# Patient Record
Sex: MALE | Race: WHITE
[De-identification: names, ages, dates, MRNs, and addresses within clinical notes are randomized per-mention and may not be internally consistent; named-entity substitution may affect disease eponyms.]

---

## 2019-01-09 ENCOUNTER — HOSPITAL ENCOUNTER (INPATIENT)
Dept: HOSPITAL 14 - H.ER | Age: 79
LOS: 2 days | Discharge: LEFT BEFORE DISCHARGE | DRG: 55 | End: 2019-01-11
Attending: GENERAL ACUTE CARE HOSPITAL | Admitting: GENERAL ACUTE CARE HOSPITAL
Payer: MEDICARE

## 2019-01-09 VITALS — BODY MASS INDEX: 27.3 KG/M2

## 2019-01-09 DIAGNOSIS — Z85.51: ICD-10-CM

## 2019-01-09 DIAGNOSIS — H40.9: ICD-10-CM

## 2019-01-09 DIAGNOSIS — I72.3: ICD-10-CM

## 2019-01-09 DIAGNOSIS — Z79.01: ICD-10-CM

## 2019-01-09 DIAGNOSIS — R26.81: ICD-10-CM

## 2019-01-09 DIAGNOSIS — S30.1XXA: ICD-10-CM

## 2019-01-09 DIAGNOSIS — J44.9: ICD-10-CM

## 2019-01-09 DIAGNOSIS — I12.9: ICD-10-CM

## 2019-01-09 DIAGNOSIS — Z87.442: ICD-10-CM

## 2019-01-09 DIAGNOSIS — H54.62: ICD-10-CM

## 2019-01-09 DIAGNOSIS — R29.6: ICD-10-CM

## 2019-01-09 DIAGNOSIS — Z86.73: ICD-10-CM

## 2019-01-09 DIAGNOSIS — Z79.899: ICD-10-CM

## 2019-01-09 DIAGNOSIS — N18.9: ICD-10-CM

## 2019-01-09 DIAGNOSIS — Z91.81: ICD-10-CM

## 2019-01-09 DIAGNOSIS — R53.1: ICD-10-CM

## 2019-01-09 DIAGNOSIS — F17.200: ICD-10-CM

## 2019-01-09 DIAGNOSIS — D49.6: Primary | ICD-10-CM

## 2019-01-09 DIAGNOSIS — M19.90: ICD-10-CM

## 2019-01-09 DIAGNOSIS — I48.2: ICD-10-CM

## 2019-01-09 LAB
ALBUMIN SERPL-MCNC: 3.9 G/DL (ref 3.5–5)
ALBUMIN/GLOB SERPL: 1.3 {RATIO} (ref 1–2.1)
ALT SERPL-CCNC: 39 U/L (ref 21–72)
APTT BLD: 41.6 SECONDS (ref 25.6–37.1)
AST SERPL-CCNC: 29 U/L (ref 17–59)
BASOPHILS # BLD AUTO: 0.1 K/UL (ref 0–0.2)
BASOPHILS NFR BLD: 1.2 % (ref 0–2)
BILIRUB UR-MCNC: NEGATIVE MG/DL
BUN SERPL-MCNC: 19 MG/DL (ref 9–20)
CALCIUM SERPL-MCNC: 9.3 MG/DL (ref 8.4–10.2)
COLOR UR: YELLOW
EOSINOPHIL # BLD AUTO: 0 K/UL (ref 0–0.7)
EOSINOPHIL NFR BLD: 0.5 % (ref 0–4)
ERYTHROCYTE [DISTWIDTH] IN BLOOD BY AUTOMATED COUNT: 16.5 % (ref 11.5–14.5)
GFR NON-AFRICAN AMERICAN: 46
GLUCOSE UR STRIP-MCNC: (no result) MG/DL
HDLC SERPL-MCNC: 46 MG/DL (ref 30–70)
HGB BLD-MCNC: 14.4 G/DL (ref 12–18)
INR PPP: 2
LDLC SERPL-MCNC: 111 MG/DL (ref 0–129)
LEUKOCYTE ESTERASE UR-ACNC: (no result) LEU/UL
LYMPHOCYTES # BLD AUTO: 1.6 K/UL (ref 1–4.3)
LYMPHOCYTES NFR BLD AUTO: 19 % (ref 20–40)
MCH RBC QN AUTO: 29.2 PG (ref 27–31)
MCHC RBC AUTO-ENTMCNC: 32.8 G/DL (ref 33–37)
MCV RBC AUTO: 89 FL (ref 80–94)
MONOCYTES # BLD: 0.5 K/UL (ref 0–0.8)
MONOCYTES NFR BLD: 6.3 % (ref 0–10)
NEUTROPHILS # BLD: 6 K/UL (ref 1.8–7)
NEUTROPHILS NFR BLD AUTO: 73 % (ref 50–75)
NRBC BLD AUTO-RTO: 0.1 % (ref 0–0)
PH UR STRIP: 5 [PH] (ref 5–8)
PLATELET # BLD: 181 K/UL (ref 130–400)
PMV BLD AUTO: 9.2 FL (ref 7.2–11.7)
PROT UR STRIP-MCNC: 30 MG/DL
PROTHROMBIN TIME: 22.8 SECONDS (ref 9.8–13.1)
RBC # BLD AUTO: 4.93 MIL/UL (ref 4.4–5.9)
RBC # UR STRIP: (no result) /UL
SP GR UR STRIP: 1.02 (ref 1–1.03)
TROPONIN I SERPL-MCNC: 0.02 NG/ML (ref 0–0.12)
URINE CLARITY: (no result)
URINE HYALINE CAST: (no result) /HPF (ref 0–2)
UROBILINOGEN UR-MCNC: (no result) MG/DL (ref 0.2–1)
VIT B12 SERPL-MCNC: 364 PG/ML (ref 239–931)
WBC # BLD AUTO: 8.2 K/UL (ref 4.8–10.8)

## 2019-01-09 RX ADMIN — PREDNISOLONE ACETATE SCH DROP: 10 SUSPENSION/ DROPS OPHTHALMIC at 19:48

## 2019-01-09 RX ADMIN — LATANOPROST SCH DROP: 50 SOLUTION OPHTHALMIC at 19:47

## 2019-01-09 RX ADMIN — LATANOPROST SCH: 50 SOLUTION OPHTHALMIC at 23:23

## 2019-01-09 RX ADMIN — PREDNISOLONE ACETATE SCH DROP: 10 SUSPENSION/ DROPS OPHTHALMIC at 23:23

## 2019-01-09 NOTE — CT
Date of service: 



01/09/2019



PROCEDURE:  CT HEAD WITHOUT CONTRAST.



HISTORY:

r/o ICH



COMPARISON:

None available.



TECHNIQUE:

Axial computed tomography images were obtained through the head/brain 

without intravenous contrast.  



Radiation dose:



Total exam DLP = 947.52 mGy-cm.



This CT exam was performed using one or more of the following dose 

reduction techniques: Automated exposure control, adjustment of the 

mA and/or kV according to patient size, and/or use of iterative 

reconstruction technique.



FINDINGS:



HEMORRHAGE:

No intracranial hemorrhage. 



BRAIN:

There is an apparent 2.5 x 3.2 cm low-attenuation area in the right 

posterior centrum semiovale and posterior parietal lobe also 

involving the right splenium of corpus callosum with mass effect and 

effacement of the atrium of the right lateral ventricle.  There are 2 

eccentric high attenuation foci in the posterior aspect of these 

lesion which may represent hemorrhage or slow flow in vessels.



There are moderate chronic microangiopathic changes.  There is no 

extra-axial fluid collection. 



VENTRICLES:





There is mild age-related global parenchymal volume loss and 

proportionate enlargement of the ventricles and cortical sulci. 



CALVARIUM:





There is no calvarial fracture or extracranial soft tissue swelling.



PARANASAL SINUSES:

Predominantly clear.



MASTOID AIR CELLS:

Predominantly clear.



OTHER FINDINGS:

None.



IMPRESSION:

1.  Apparent mass like lesion with eccentric small foci of 

hemorrhage/slow flow in blood vessels in the right posterior centrum 

semiovale and parietal lobe also involving the right splenium of 

corpus callosum with mass effect and effacement of the atrium of the 

right lateral ventricle.  The differential considerations include 

benign and malignant primary or secondary neoplasm, and nonspecific 

infection/inflammation.  An MRI of the brain without and with 

intravenous contrast is recommended for further characterization. 



2.  Moderate chronic microangiopathic changes and mild age-related 

global parenchymal volume loss. 



Important findings were discussed with Dr. Sesar Singh in the ER on 

01/09/2019 at 3:25 p.m.

## 2019-01-09 NOTE — CP.PCM.HP
History of Present Illness





- History of Present Illness


History of Present Illness: 


CC: frequent falls with left-sided weakness





HPI: 77 y/o male patient with PMHx of Cardiac Arrhythmias (A-Fib), COPD, HTN, 

Bladder cancer, and Chronic Kidney Disease was seen and evaluated in the ED due 

to complaints of multiple falls and with L-sided weakness. Patient states he has

been experiencing multiple falls as he feels unsteady and imbalanced. Patient 

was in [place] 2 weeks ago with daughter, sustained a fall in the b

athroom and was seen by a physician there. Patient reports no follow-up workup 

was completed to rule out CVA. Patient is complaining of left-sided flank pain. 

Patient is AAOx3. At this time, patient denies headaches, dizziness, fever, 

nausea, vomiting, abdominal pain, shortness of breath, chest pain or any urinary

complaints. Patient states he lives alone, and has a brother who lives in 

[place].





Vitals: Temp 98.1, Pulse 81, /95, RR 16, O2 stat 96





PMD/Cardiologist: Dr. Priest  


PMHx: Cardiac Arrhythmias (A-Fib), COPD, HTN, Bladder cancer, and Chronic Kidney

Disease, Left Eye blindness 


PSHx: Left Eye Surgery, Bladder Surgery, Endoscopy


Family Hx: unknown


Medications: see med chart


Allergies: no known allergies


Social Hx: current smoker





ED Course: 


Head CT


Abd/Pelvic CT


Labs


CXR/EKG


Neuro Consult


Cardio Consult





Emergency Contact: Benny Gaines [phone]








Present on Admission





- Present on Admission


Any Indicators Present on Admission: No


History of DVT/PE: No


History of Uncontrolled Diabetes: No


Urinary Catheter: No


Decubitus Ulcer Present: No





Review of Systems





- Constitutional


Constitutional: absent: Chills, Fever





- EENT


Eyes: absent: Blurred Vision, Change in Vision


Ears: absent: Dizziness





- Cardiovascular


Cardiovascular: absent: Chest Pain, Chest Pain at Rest, Chest Pain with 

Activity, Dyspnea on Exertion





- Respiratory


Respiratory: absent: Cough





- Gastrointestinal


Gastrointestinal: absent: Abdominal Pain, Constipation, Diarrhea, Nausea, 

Vomiting





- Neurological


Neurological: Weakness.  absent: Numbness, Tingling


Additional comments: 


L sided weakness








Past Patient History





- Past Medical History & Family History


Past Medical History?: Yes





- Past Social History


Smoking Status: Former Smoker





- CARDIAC


Hx Cardia Arrhythmia: Yes (a fib)


Hx Hypertension: Yes





- PULMONARY


Hx Chronic Obstructive Pulmonary Disease (COPD): Yes ((PER CHEST X-RAY RESULT 

05/12/16))





- NEUROLOGICAL


Hx Neurological Disorder: Yes


HX Cerebrovascular Accident: Yes (left eye)





- HEENT


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





- RENAL


Hx Chronic Kidney Disease: Yes


Hx Kidney Stones: Yes (PASSED ON OWN)





- HEMATOLOGICAL/ONCOLOGICAL


Hx Blood Disorders: Yes


Hx Cancer: Yes (BLADDER CANCER)





- MUSCULOSKELETAL/RHEUMATOLOGICAL


Hx Musculoskeletal Disorders: Yes


Hx Osteoarthritis: Yes





- GENITOURINARY/GYNECOLOGICAL


Hx Hematuria: Yes





- PSYCHIATRIC


Hx Substance Use: No





- SURGICAL HISTORY


Hx Surgeries: Yes


Other/Comment: cystoscopy bladder bx ful





- ANESTHESIA


Hx Anesthesia: Yes


Hx Anesthesia Reactions: Yes (tachycardia during colonoscopy)


Hx Malignant Hyperthermia: No





Meds


Allergies/Adverse Reactions: 


                                    Allergies











Allergy/AdvReac Type Severity Reaction Status Date / Time


 


No Known Allergies Allergy   Verified 01/09/19 12:32














Physical Exam





- Constitutional


Appears: Well, Non-toxic, No Acute Distress





- Head Exam


Head Exam: ATRAUMATIC, NORMOCEPHALIC





- Eye Exam


Eye Exam: Normal appearance, PERRL





- ENT Exam


ENT Exam: Mucous Membranes Moist, Normal Exam





- Neck Exam


Neck exam: Negative for: Full Rom, Lymphadenopathy





- Respiratory Exam


Respiratory Exam: Clear to Auscultation Bilateral, NORMAL BREATHING PATTERN.  

absent: Rales, Rhonchi, Wheezes





- Cardiovascular Exam


Cardiovascular Exam: Irregular Rhythm





- GI/Abdominal Exam


GI & Abdominal Exam: Normal Bowel Sounds, Soft.  absent: Distended, Firm, 

Guarding, Tenderness





- Neurological Exam


Neurological exam: Alert, Oriented x3





- Psychiatric Exam


Psychiatric exam: Normal Affect, Normal Mood





- Skin


Skin Exam: Normal Color





Results





- Vital Signs


Recent Vital Signs: 





                                Last Vital Signs











Temp  98.1 F   01/09/19 12:32


 


Pulse  81   01/09/19 12:32


 


Resp  16   01/09/19 12:32


 


BP  149/95 H  01/09/19 12:32


 


Pulse Ox  96   01/09/19 12:59














- Labs


Result Diagrams: 


                                 01/09/19 14:21





                                 01/09/19 14:21


Labs: 





                         Laboratory Results - last 24 hr











  01/09/19 01/09/19 01/09/19





  14:21 14:21 14:21


 


WBC  8.2  


 


RBC  4.93  


 


Hgb  14.4  


 


Hct  43.9  


 


MCV  89.0  


 


MCH  29.2  


 


MCHC  32.8 L  


 


RDW  16.5 H  


 


Plt Count  181  


 


MPV  9.2  


 


Neut % (Auto)  73.0  


 


Lymph % (Auto)  19.0 L  


 


Mono % (Auto)  6.3  


 


Eos % (Auto)  0.5  


 


Baso % (Auto)  1.2  


 


Neut # (Auto)  6.0  


 


Lymph # (Auto)  1.6  


 


Mono # (Auto)  0.5  


 


Eos # (Auto)  0.0  


 


Baso # (Auto)  0.1  


 


PT    22.8 H


 


INR    2.0


 


APTT    41.6 H


 


Sodium   143 


 


Potassium   4.1 


 


Chloride   106 


 


Carbon Dioxide   23 


 


Anion Gap   18 


 


BUN   19 


 


Creatinine   1.5 


 


Est GFR ( Amer)   55 


 


Est GFR (Non-Af Amer)   46 


 


Random Glucose   110 


 


Calcium   9.3 


 


Total Bilirubin   1.3 


 


AST   29 


 


ALT   39 


 


Alkaline Phosphatase   92 


 


Total Creatine Kinase   187 H 


 


Troponin I   0.0200 


 


Total Protein   7.0 


 


Albumin   3.9 


 


Globulin   3.1 


 


Albumin/Globulin Ratio   1.3 


 


TSH 3rd Generation   0.81 














Assessment & Plan





- Assessment and Plan (Free Text)


Assessment: 


77 y/o male with PMHx of Cardiac Arrhythmias (A-Fib), COPD, HTN, Kidney Stones, 

and Chronic Kidney Disease admitted for frequent falls with hx of left sided 

weakness, r/o brain mass and/or hemorrhage, findings suspicious on Head CT





Plan: 


Frequent Fall


- acute, r/o brain mass and/or hemorrhage, findings suspicious on Head CT


- Head CT: mass like lesion with eccentric small foci of hemorrhage/slow flow in

blood vessels in the right posterior centrum semiovale and parietal lobe 

involving the right splenium of corpus callosum, MRI recommended 


- Hold Coumadin at this time


- CXR: no active disease


- Neurology Consult, Dr. Meneses- recommendations appreciated


- Neuro Checks





Left Flank Pain with bruising


- acute, symptomatic 


- Abd/Pelvic CT: no acute abdominal or pelvic abnormality, no evidence 

intraperitoneal or retroperitoneal hemorrhage 





Atrial Fibrillation


- chronic, rate-controlled


- patient on Warfarin 3mg and 4 mg tabs alternatively


- Hold Coumadin at this time


- Repeat INR tomorrow





HTN


- chronic, controlled


- continue home meds 





Left Sided Blindness


- chronic


- continue home meds





Diet


- Heart Healthy Diet





DVT Prophylaxis


- SCDs for now





Code Status


- Unknown Full Code








- Date & Time


Date: 01/09/19


Time: 16:52

## 2019-01-09 NOTE — CARD
--------------- APPROVED REPORT --------------





Date of service: 01/09/2019



EKG Measurement

Heart [id number]

[id number]

[id number]

[id number]



<Conclusion>

Atrial fibrillation

ST & T wave abnormality, consider anterolateral ischemia

Prolonged QT

Abnormal ECG

## 2019-01-09 NOTE — ED PDOC
HPI: General Adult


Time Seen by Provider: 01/09/19 12:42


Chief Complaint (Nursing): Weakness/Neurological Deficit


Chief Complaint (Provider): frequent falls, L sided weakness


History Per: Patient


History/Exam Limitations: no limitations


Onset/Duration Of Symptoms: Days (2-3 weeks), Intermittent Episodes


Current Symptoms Are (Timing): Still Present


Severity: Moderate


Location Of Discomfort (Image): 


                            __________________________














                            __________________________





 1 - pain, bruising





Recent Trauma: +falls


Recently: Treated By A Physician


Additional Complaint(s): 





79yo male c/o frequent falls and ongoing imbalance, L sided weakness, L flank d

iscomfort after a fall in [place] 2 weeks ago, takes warfarin daily. 


Notes difficulty w L eye since surgery for "bleeding in eye" several months ago.

 





Past Medical History


Reviewed: Historical Data, Nursing Documentation, Vital Signs


Vital Signs: 





                                Last Vital Signs











Temp  98.1 F   01/09/19 12:32


 


Pulse  81   01/09/19 12:32


 


Resp  16   01/09/19 12:32


 


BP  149/95 H  01/09/19 12:32


 


Pulse Ox  96   01/09/19 12:32














- Medical History


PMH: Cardia Arrhythmia (a fib), COPD ((PER CHEST X-RAY RESULT 05/12/16)), HTN, 

Kidney Stones (PASSED ON OWN), Chronic Kidney Disease





- Surgical History


Surgical History: Endoscopy


Other surgeries: eye and bladder





- Family History


Family History: States: Unknown Family Hx





- Living Arrangements


Living Arrangements: Alone





- Social History


Current smoker - smoking cessation education provided: No





- Home Medications


Home Medications: 


                                Ambulatory Orders











 Medication  Instructions  Recorded


 


Bimatoprost [Lumigan 2.5 ml] 1 drop OU BID 01/21/15


 


Warfarin [Coumadin] 4 mg PO Q48H 01/21/15


 


Metoprolol Succinate XL [Toprol XL] 100 mg PO DAILY 05/12/16


 


Losartan [Cozaar] 100 mg PO DAILY 01/09/19


 


PrednisoLONE 1% [Pred Forte 1% 1 drop RIGHTEYE QID 01/09/19





Opht Susp]  


 


Warfarin [Coumadin] 3 mg PO Q48H 01/09/19














- Allergies


Allergies/Adverse Reactions: 


                                    Allergies











Allergy/AdvReac Type Severity Reaction Status Date / Time


 


No Known Allergies Allergy   Verified 01/09/19 12:32














Review of Systems


ROS Statement: Except As Marked, All Systems Reviewed And Found Negative


Constitutional: Negative for: Fever


Cardiovascular: Negative for: Chest Pain


Respiratory: Negative for: Cough, Shortness of Breath


Gastrointestinal: Negative for: Nausea, Abdominal Pain


Genitourinary Male: Negative for: Dysuria


Musculoskeletal: Positive for: Back Pain, Other (elbow).  Negative for: Neck 

Pain


Skin: Negative for: Rash, Lesions, Jaundice


Neurological: Positive for: Weakness, Incoordination, Headache, Dizziness.  

Negative for: Numbness, Change in Speech, Confusion





Physical Exam





- Reviewed


Nursing Documentation Reviewed: Yes


Vital Signs Reviewed: Yes





- Physical Exam


Appears: Positive for: Well, Non-toxic, No Acute Distress


Head Exam: Positive for: ATRAUMATIC, NORMAL INSPECTION, NORMOCEPHALIC


Skin: Positive for: Normal Color, Warm, DRY


Eye Exam: Positive for: EOMI, Normal appearance, PERRL


ENT: Positive for: Normal ENT Inspection


Neck: Positive for: Normal, Painless ROM


Cardiovascular/Chest: Positive for: Regular Rate, Rhythm


Respiratory: Positive for: CNT, Normal Breath Sounds


Pulses-Radial (L): 3+/4+


Pulses-Radial (R): 3+/4+


Gastrointestinal/Abdominal: Positive for: Soft, Other (L flank +ecchymosis).  

Negative for: Tenderness


Back: Positive for: Normal Inspection


Extremity: Positive for: Normal ROM, Other (L elbow superficial abrasion 

nontender bony prominences)


Neurologic/Psych: Positive for: Alert, Oriented.  Negative for: Motor/Sensory 

Deficits





- Laboratory Results


Result Diagrams: 


                                 01/09/19 14:21





                                 01/09/19 14:21





- ECG


O2 Sat by Pulse Oximetry: 96





Medical Decision Making


Medical Decision Making: 





workup for frequent falls while taking coumadin initiated








CT results d/w neurology Dr Meneses saw patient in ED, rec MRI brain w and wo 

contrast, order placed admitting team Dr Drake for Dr Priest aware to 

follow result





Rec hold anticoagulation given potential small hemorrhage on CT








Accession No. : [id number]


Patient Name / ID : NICCI PECK  / [id number]


Exam Date : 01/09/2019 14:48:39 ( Approved )


Study Comment : 


Sex / Age : M  / 78Y





Creator : Elzbieta Donnelly MD


Dictator : Elzbieta Donnelly MD


 : 


Approver : Elzbieta Donnelly MD


Approver2 : 





Report Date : 01/09/2019 15:33:20


My Comment : 


********************************************

***************************************





Date of service: 





01/09/2019





PROCEDURE:  CT HEAD WITHOUT CONTRAST.





HISTORY:


r/o ICH





COMPARISON:


None available.





TECHNIQUE:


Axial computed tomography images were obtained through the head/brain without 

intravenous contrast.  





Radiation dose:





Total exam DLP = 947.52 mGy-cm.





This CT exam was performed using one or more of the following dose reduction 

techniques: Automated exposure control, adjustment of the mA and/or kV according

 to patient size, and/or use of iterative reconstruction technique.





FINDINGS:





HEMORRHAGE:


No intracranial hemorrhage. 





BRAIN:


There is an apparent 2.5 x 3.2 cm low-attenuation area in the right posterior 

centrum semiovale and posterior parietal lobe also involving the right splenium 

of corpus callosum with mass effect and effacement of the atrium of the right 

lateral ventricle.  There are 2 eccentric high attenuation foci in the posterior

 aspect of these lesion which may represent hemorrhage or slow flow in vessels.





There are moderate chronic microangiopathic changes.  There is no extra-axial 

fluid collection. 





VENTRICLES:








There is mild age-related global parenchymal volume loss and proportionate e

nlargement of the ventricles and cortical sulci. 





CALVARIUM:








There is no calvarial fracture or extracranial soft tissue swelling.





PARANASAL SINUSES:


Predominantly clear.





MASTOID AIR CELLS:


Predominantly clear.





OTHER FINDINGS:


None.





IMPRESSION:


1.  Apparent mass like lesion with eccentric small foci of hemorrhage/slow flow 

in blood vessels in the right posterior centrum semiovale and parietal lobe also

 involving the right splenium of corpus callosum with mass effect and effacement

 of the atrium of the right lateral ventricle.  The differential considerations 

include benign and malignant primary or secondary neoplasm, and nonspecific 

infection/inflammation.  An MRI of the brain without and with intravenous 

contrast is recommended for further characterization. 





2.  Moderate chronic microangiopathic changes and mild age-related global 

parenchymal volume loss. 





Important findings were discussed with Dr. Sesar Singh in the ER on 01/09/2019 

at 3:25 p.m.











Accession No. : [id number]


Patient Name / ID : NICCI PECK  / [id number]


Exam Date : 01/09/2019 14:51:19 ( Approved )


Study Comment : 


Sex / Age : M  / 78Y





Creator : Elzbieta Donnelly MD


Dictator : Elzbieta Donnelly MD


 : 


Approver : Elzbieta Donnelly MD


Approver2 : 





Report Date : 01/09/2019 15:48:56


My Comment : 


****************************

*******************************************************





Date of service: 





01/09/2019





PROCEDURE:  CT Abdomen and Pelvis without intravenous contrast





HISTORY:


L flank ecchymosis, falls, on warfarin





COMPARISON:


None.





TECHNIQUE:


CT scan of the abdomen and pelvis was performed without administration of 

intravenous contrast. Oral contrast was not administered. Coronal and sagittal 

reformatted images were obtained. . 





Radiation dose:





Total exam DLP = 537.5 mGy-cm.





This CT exam was performed using one or more of the following dose reduction 

techniques: Automated exposure control, adjustment of the mA and/or kV according

 to patient size, and/or use of iterative reconstruction technique.





FINDINGS:





LOWER THORAX:


The visualized lungs are clear. 





LIVER:


Normal in size. No intrahepatic ductal dilatation. 





GALLBLADDER AND BILE DUCTS:


The gallbladder is contracted.  No calcified gallstones. No biliary dilatation





PANCREAS:


Normal in size.  There is a punctate nonspecific calcification in the body of 

the pancreas.  No ductal dilatation.





SPLEEN:


Normal in size.





ADRENALS:


The right adrenal gland is normal in size without discrete nodule.  There is a 

1.3 cm benign adenoma in the left adrenal gland. 





KIDNEYS AND URETERS:


Normal in size without nephrolithiasis. No hydronephrosis.  There is nonspecific

 perinephric fat stranding. 





VASCULATURE:


The aorta is tortuous.  There is fusiform aneurysm of the distal infrarenal 

aorta measuring 3.3 cm in diameter. There are aortic and I iliac artery 

atherosclerotic calcifications present.





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 

measures 1.5 cm in diameter. 





BOWEL:


The small bowel loops are normal in caliber. The colon is normal in size. No 

bowel dilatation or wall thickening.  No bowel obstruction. 





APPENDIX:


Normal appendix. 





PERITONEUM:


No free fluid. No free air. 





LYMPH NODES:


No enlarged lymph nodes.





BLADDER:


Partially decompressed. 





REPRODUCTIVE:


There is mild enlargement of the prostate gland. 





BONES:


No acute fracture.  Multilevel degenerative changes, worse in the lower lumbar 

spine 





OTHER FINDINGS:


There are bilateral small fat containing inguinal hernias.  There is a small 

sliding hiatal hernia.





There is minimal subcutaneous fat stranding in the left flank without evidence 

for subcutaneous hematoma. 





IMPRESSION:


No acute abdominal or pelvic abnormality.  Specifically, no evidence for acute 

intraperitoneal or retroperitoneal hemorrhage. 





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











Disposition





- Clinical Impression


Clinical Impression: 


 Brain mass, Left-sided weakness








- Patient ED Disposition


Is Patient to be Admitted: Yes


Counseled Patient/Family Regarding: Studies Performed, Diagnosis





- Disposition


Disposition Time: 14:01


Condition: FAIR





- Pt Status Changed To:


Hospital Disposition Of: Inpatient





- Admit Certification


Admit to Inpatient:: After my assessment, the patient will require 

hospitalization for at least two midnights.  This is because of the severity of 

symptoms shown, intensity of services needed, and/or the medical risk in this 

patient being treated as an outpatient.





- POA


Present On Arrival: Falls Or Trauma

## 2019-01-09 NOTE — CP.PCM.CON
History of Present Illness





- History of Present Illness


History of Present Illness: 





THE PATIENT IS A 78 YEAR OLD MALE WHO HAS A LONG HISTORY OF HYPERTENSION, 

CHRONIC ATRIAL FIBRILLATION, HE  IS A FORMER SMOKER AND HAD BLADDER CANCER, AND 

HE HAD A STROKE INVOLVING HIS LEFT EYE SEVERAL YEARS AGO WITH RESULTING 

DECREASED VISION IN THAT EYE.  HE HAD LEFT EYE SURGERY A FEW MONTHS AGO BUT 

DOESN'T KNOW DETAILS OF THAT SURGERY.  I HAVEN'T SEEN HIM FOR 9 MONTHS AND I SAW

HIM IN THE OFFICE YESTERDAY AND HE STATED THAT FOR A FEW MONTHS HE HAD NEW LEFT 

SIDED WEAKNESS, POOR BALANCE WITH AN UNSTEADY GAIT AND SEVERAL FALLS.  HE WAS IN

[place] RECENTLY VISITING HIS DAUGHTER FOR THE HOLIDAYS AND I SPOKE TO 

HER AND SHE THOUGHT THAT HE COULD HARDLY WALK ONE DAY A COUPLE OF WEEKS AGO AND 

SHE BROUGHT HIM TO A LOCAL PHYSICIAN WHO SAW HIM IN THE OFFICE AND HIS INR WAS 

SUBTHERAPEUTIC.  HE DID NOT GO TO THE HOSPITAL AND DID NOT HAVE ANY NEUROLOGICAL

IMAGING AND CAME BACK TO [place] AFTER THE HOLIDAYS.  HE STATED THAT HE KEEPS

FALLING AND IS CONFUSED AND FELL 2 DAYS AGO AND HIT THE LEFT SIDE OF HIS CHEST 

ON A CHAIR AND IT BECAME BRUISED.  I TOLD HIM THAT HE SHOULD GO TO THE ER FOR 

EVALUATION INCLUDING BRAIN IMAGING AND BE ADMITTED FOR NEUROLOGICAL EVALUATION 

AND TREATMENT.  IT WAS ALSO UNSAFE OF HIM TO STAY HOME DUE TO HIS MANY FALLS.  

HE CAME TO THE ER TODAY AND A CT SHOWED A RIGHT BRAIN MASS AND HE WAS ADMITTED.





Past Patient History





- Past Medical History & Family History


Past Medical History?: Yes





- Past Social History


Smoking Status: Former Smoker





- CARDIAC


Hx Cardia Arrhythmia: Yes (a fib)


Hx Hypertension: Yes





- PULMONARY


Hx Chronic Obstructive Pulmonary Disease (COPD): Yes ((PER CHEST X-RAY RESULT 

05/12/16))





- NEUROLOGICAL


Hx Neurological Disorder: Yes


HX Cerebrovascular Accident: Yes (left eye)





- HEENT


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





- RENAL


Hx Chronic Kidney Disease: Yes


Hx Kidney Stones: Yes (PASSED ON OWN)





- HEMATOLOGICAL/ONCOLOGICAL


Hx Blood Disorders: Yes


Hx Cancer: Yes (BLADDER CANCER)





- MUSCULOSKELETAL/RHEUMATOLOGICAL


Hx Musculoskeletal Disorders: Yes


Hx Osteoarthritis: Yes





- GENITOURINARY/GYNECOLOGICAL


Hx Hematuria: Yes





- PSYCHIATRIC


Hx Substance Use: No





- SURGICAL HISTORY


Hx Surgeries: Yes


Other/Comment: cystoscopy bladder bx ful





- ANESTHESIA


Hx Anesthesia: Yes


Hx Anesthesia Reactions: Yes (tachycardia during colonoscopy)


Hx Malignant Hyperthermia: No





Meds


Allergies/Adverse Reactions: 


                                    Allergies











Allergy/AdvReac Type Severity Reaction Status Date / Time


 


No Known Allergies Allergy   Verified 01/09/19 12:32














- Medications


Medications: 


                               Current Medications





Docusate Sodium (Colace)  100 mg PO BID PRN


   PRN Reason: Constipation


Latanoprost (Xalatan Opht)  1 drop OU HS VAISHNAVI


Losartan Potassium (Cozaar)  100 mg PO DAILY VAISHNAVI


Metoprolol Succinate (Toprol Xl)  100 mg PO DAILY VAISHNAVI


Prednisolone Acetate (Pred Forte 1% Opht Susp)  1 drop OU QID VAISHNAVI











Physical Exam





- Respiratory Exam


Respiratory Exam: Clear to Auscultation Bilateral





- Cardiovascular Exam


Cardiovascular Exam: Irregular Rhythm, +S1, +S2





- Extremities Exam


Additional comments: 





NO SIGNIFICANT LE EDEMA





- Neurological Exam


Additional comments: 





LEFT SIDED WEAKNESS





- Skin


Additional comments: 





LEFT CHEST ECCHYMOSIS





- Additional Findings


Additional findings: 





CT OF THE BRAIN SHOWS A RIGHT SIDED MASS WITH SMALL HEMORRHAGIC FOCI





EKG ATRIAL FINRILLATION





CXR CARDIOMEGALY, CLEAR LUNG BERNAL





Results





- Vital Signs


Recent Vital Signs: 


                                Last Vital Signs











Temp  98 F   01/09/19 17:32


 


Pulse  78   01/09/19 17:32


 


Resp  18   01/09/19 17:32


 


BP  122/83   01/09/19 17:32


 


Pulse Ox  97   01/09/19 17:32














- Labs


Result Diagrams: 


                                 01/09/19 14:21





                                 01/09/19 14:21


Labs: 


                         Laboratory Results - last 24 hr











  01/09/19 01/09/19 01/09/19





  14:21 14:21 14:21


 


WBC  8.2  


 


RBC  4.93  


 


Hgb  14.4  


 


Hct  43.9  


 


MCV  89.0  


 


MCH  29.2  


 


MCHC  32.8 L  


 


RDW  16.5 H  


 


Plt Count  181  


 


MPV  9.2  


 


Neut % (Auto)  73.0  


 


Lymph % (Auto)  19.0 L  


 


Mono % (Auto)  6.3  


 


Eos % (Auto)  0.5  


 


Baso % (Auto)  1.2  


 


Neut # (Auto)  6.0  


 


Lymph # (Auto)  1.6  


 


Mono # (Auto)  0.5  


 


Eos # (Auto)  0.0  


 


Baso # (Auto)  0.1  


 


PT    22.8 H


 


INR    2.0


 


APTT    41.6 H


 


Sodium   143 


 


Potassium   4.1 


 


Chloride   106 


 


Carbon Dioxide   23 


 


Anion Gap   18 


 


BUN   19 


 


Creatinine   1.5 


 


Est GFR ( Amer)   55 


 


Est GFR (Non-Af Amer)   46 


 


Random Glucose   110 


 


Calcium   9.3 


 


Total Bilirubin   1.3 


 


AST   29 


 


ALT   39 


 


Alkaline Phosphatase   92 


 


Total Creatine Kinase   187 H 


 


Troponin I   0.0200 


 


Total Protein   7.0 


 


Albumin   3.9 


 


Globulin   3.1 


 


Albumin/Globulin Ratio   1.3 


 


Triglycerides   


 


Cholesterol   


 


LDL Cholesterol Direct   


 


HDL Cholesterol   


 


Vitamin B12   


 


TSH 3rd Generation   0.81 


 


Urine Color   


 


Urine Clarity   


 


Urine pH   


 


Ur Specific Gravity   


 


Urine Protein   


 


Urine Glucose (UA)   


 


Urine Ketones   


 


Urine Blood   


 


Urine Nitrate   


 


Urine Bilirubin   


 


Urine Urobilinogen   


 


Ur Leukocyte Esterase   


 


Urine RBC (Auto)   


 


Urine Microscopic WBC   


 


Hyaline Casts   














  01/09/19 01/09/19





  16:30 17:19


 


WBC  


 


RBC  


 


Hgb  


 


Hct  


 


MCV  


 


MCH  


 


MCHC  


 


RDW  


 


Plt Count  


 


MPV  


 


Neut % (Auto)  


 


Lymph % (Auto)  


 


Mono % (Auto)  


 


Eos % (Auto)  


 


Baso % (Auto)  


 


Neut # (Auto)  


 


Lymph # (Auto)  


 


Mono # (Auto)  


 


Eos # (Auto)  


 


Baso # (Auto)  


 


PT  


 


INR  


 


APTT  


 


Sodium  


 


Potassium  


 


Chloride  


 


Carbon Dioxide  


 


Anion Gap  


 


BUN  


 


Creatinine  


 


Est GFR ( Amer)  


 


Est GFR (Non-Af Amer)  


 


Random Glucose  


 


Calcium  


 


Total Bilirubin  


 


AST  


 


ALT  


 


Alkaline Phosphatase  


 


Total Creatine Kinase  


 


Troponin I  


 


Total Protein  


 


Albumin  


 


Globulin  


 


Albumin/Globulin Ratio  


 


Triglycerides  94 


 


Cholesterol  169 


 


LDL Cholesterol Direct  111 


 


HDL Cholesterol  46 


 


Vitamin B12  364 


 


TSH 3rd Generation  


 


Urine Color   Yellow


 


Urine Clarity   Slighty-cloudy


 


Urine pH   5.0


 


Ur Specific Gravity   1.023


 


Urine Protein   30


 


Urine Glucose (UA)   Neg


 


Urine Ketones   Trace


 


Urine Blood   Moderate


 


Urine Nitrate   Negative


 


Urine Bilirubin   Negative


 


Urine Urobilinogen   0.2-1.0


 


Ur Leukocyte Esterase   Neg


 


Urine RBC (Auto)   24 H


 


Urine Microscopic WBC   3


 


Hyaline Casts   6-10 H














Assessment & Plan





- Assessment and Plan (Free Text)


Assessment: 





RIGHT BRAIN TUMOR WITH LEFT SIDED WEAKNESS WITH GAIT DISTIRBANCE, RECURRENT 

FALLS AND CONFUSION





CHRONIC ATRIAL FIBRILLATION





HYPERTENSION





HISTORY OF BLADDER CA


Plan: 





THE PATIENT WAS ADMITTED





NEUROLOGY CONSULTATION





MRI OF THE BRAIN





METOPROLOL AND LOSARTAN





HOLD WARFARIN DUE TO BRAIN HEMORRHAGIC FOCI





THE DAUGHTER WAS CALLED AND UPDATED AS TO THE CT RESULTS AND PENDING MRI AND 

NEUROLOGY EVALUATION

## 2019-01-09 NOTE — RAD
Date of service: 



01/09/2019



HISTORY:

 SOB 



COMPARISON:

No prior. 



FINDINGS:



LUNGS:

No active pulmonary disease.



PLEURA:

No significant pleural effusion identified, no pneumothorax apparent.



CARDIOVASCULAR:

No atherosclerotic calcification present



Cardiomegaly.  No evidence of acute, significant cardiovascular 

disease. 



OSSEOUS STRUCTURES:

No significant abnormalities.



VISUALIZED UPPER ABDOMEN:

Normal.



OTHER FINDINGS:

None.



IMPRESSION:

No active disease.

## 2019-01-09 NOTE — CT
Date of service: 



01/09/2019



PROCEDURE:  CT Abdomen and Pelvis without intravenous contrast



HISTORY:

L flank ecchymosis, falls, on warfarin



COMPARISON:

None.



TECHNIQUE:

CT scan of the abdomen and pelvis was performed without 

administration of intravenous contrast. Oral contrast was not 

administered. Coronal and sagittal reformatted images were obtained. 

. 



Radiation dose:



Total exam DLP = 537.5 mGy-cm.



This CT exam was performed using one or more of the following dose 

reduction techniques: Automated exposure control, adjustment of the 

mA and/or kV according to patient size, and/or use of iterative 

reconstruction technique.



FINDINGS:



LOWER THORAX:

The visualized lungs are clear. 



LIVER:

Normal in size. No intrahepatic ductal dilatation. 



GALLBLADDER AND BILE DUCTS:

The gallbladder is contracted.  No calcified gallstones. No biliary 

dilatation



PANCREAS:

Normal in size.  There is a punctate nonspecific calcification in the 

body of the pancreas.  No ductal dilatation.



SPLEEN:

Normal in size.



ADRENALS:

The right adrenal gland is normal in size without discrete nodule.  

There is a 1.3 cm benign adenoma in the left adrenal gland. 



KIDNEYS AND URETERS:

Normal in size without nephrolithiasis. No hydronephrosis.  There is 

nonspecific perinephric fat stranding. 



VASCULATURE:

The aorta is tortuous.  There is fusiform aneurysm of the distal 

infrarenal aorta measuring 3.3 cm in diameter. There are aortic and I 

iliac artery atherosclerotic calcifications present.



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 

artery which measures 1.5 cm in diameter. 



BOWEL:

The small bowel loops are normal in caliber. The colon is normal in 

size. No bowel dilatation or wall thickening.  No bowel obstruction. 



APPENDIX:

Normal appendix. 



PERITONEUM:

No free fluid. No free air. 



LYMPH NODES:

No enlarged lymph nodes.



BLADDER:

Partially decompressed. 



REPRODUCTIVE:

There is mild enlargement of the prostate gland. 



BONES:

No acute fracture.  Multilevel degenerative changes, worse in the 

lower lumbar spine 



OTHER FINDINGS:

There are bilateral small fat containing inguinal hernias.  There is 

a small sliding hiatal hernia.



There is minimal subcutaneous fat stranding in the left flank without 

evidence for subcutaneous hematoma. 



IMPRESSION:

No acute abdominal or pelvic abnormality.  Specifically, no evidence 

for acute intraperitoneal or retroperitoneal hemorrhage. 



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 

common iliac artery.

## 2019-01-10 LAB
ALBUMIN SERPL-MCNC: 3.8 G/DL (ref 3.5–5)
ALBUMIN/GLOB SERPL: 1.2 {RATIO} (ref 1–2.1)
ALT SERPL-CCNC: 42 U/L (ref 21–72)
APTT BLD: 45 SECONDS (ref 25.6–37.1)
AST SERPL-CCNC: 28 U/L (ref 17–59)
BASOPHILS # BLD AUTO: 0 K/UL (ref 0–0.2)
BASOPHILS NFR BLD: 0.8 % (ref 0–2)
BUN SERPL-MCNC: 22 MG/DL (ref 9–20)
CALCIUM SERPL-MCNC: 9.4 MG/DL (ref 8.4–10.2)
EOSINOPHIL # BLD AUTO: 0.1 K/UL (ref 0–0.7)
EOSINOPHIL NFR BLD: 1.1 % (ref 0–4)
ERYTHROCYTE [DISTWIDTH] IN BLOOD BY AUTOMATED COUNT: 16.2 % (ref 11.5–14.5)
GFR NON-AFRICAN AMERICAN: 49
HGB BLD-MCNC: 14.1 G/DL (ref 12–18)
INR PPP: 2.2
LYMPHOCYTES # BLD AUTO: 1.7 K/UL (ref 1–4.3)
LYMPHOCYTES NFR BLD AUTO: 26.5 % (ref 20–40)
MCH RBC QN AUTO: 29.4 PG (ref 27–31)
MCHC RBC AUTO-ENTMCNC: 33 G/DL (ref 33–37)
MCV RBC AUTO: 89 FL (ref 80–94)
MONOCYTES # BLD: 0.5 K/UL (ref 0–0.8)
MONOCYTES NFR BLD: 8.5 % (ref 0–10)
NEUTROPHILS # BLD: 3.9 K/UL (ref 1.8–7)
NEUTROPHILS NFR BLD AUTO: 63.1 % (ref 50–75)
NRBC BLD AUTO-RTO: 0.1 % (ref 0–0)
PLATELET # BLD: 163 K/UL (ref 130–400)
PMV BLD AUTO: 9.3 FL (ref 7.2–11.7)
PROTHROMBIN TIME: 24.6 SECONDS (ref 9.8–13.1)
RBC # BLD AUTO: 4.8 MIL/UL (ref 4.4–5.9)
WBC # BLD AUTO: 6.2 K/UL (ref 4.8–10.8)

## 2019-01-10 RX ADMIN — PREDNISOLONE ACETATE SCH DROP: 10 SUSPENSION/ DROPS OPHTHALMIC at 14:20

## 2019-01-10 RX ADMIN — LATANOPROST SCH DROP: 50 SOLUTION OPHTHALMIC at 21:07

## 2019-01-10 RX ADMIN — PREDNISOLONE ACETATE SCH DROP: 10 SUSPENSION/ DROPS OPHTHALMIC at 21:08

## 2019-01-10 RX ADMIN — METOPROLOL SUCCINATE SCH MG: 100 TABLET, EXTENDED RELEASE ORAL at 11:00

## 2019-01-10 RX ADMIN — PREDNISOLONE ACETATE SCH DROP: 10 SUSPENSION/ DROPS OPHTHALMIC at 10:58

## 2019-01-10 RX ADMIN — PREDNISOLONE ACETATE SCH DROP: 10 SUSPENSION/ DROPS OPHTHALMIC at 17:34

## 2019-01-10 NOTE — CP.PCM.CON
History of Present Illness





- History of Present Illness


History of Present Illness: 





Neurology Consultation Note:





Mr. Gaines is a 78-year-old man, referred to me by Dr. Singh, with a past 

medical history of atrial fibrillation on coumadin, COPD, HTN, Bladder cancer, 

and Chronic Kidney Disease was seen and evaluated in the ED due to complaints of

multiple falls and with L-sided weakness.  Non-contrast CT scan of the head 

showed a mass-like hypodensity in the right hemisphere. The patient denied 

headache, nausea, vomiting, visual changes or loss of consciousness. 





Review of Systems





- Constitutional


Constitutional: As Per HPI





- EENT


Eyes: absent: As Per HPI, Blind Spots, Blurred Vision, Change in Vision, 

Decreased Night Vision, Diplopia, Discharge, Dry Eye, Exophthalmos, Floaters, 

Irritation, Itchy Eyes, Loss of Peripheral Vision, Pain, Photophobia, Requires 

Corrective Lenses, Sees Flashes, Spots in Vision, Tunnel Vision, Other Visual 

Disturbances, Loss of Vision, Other


Ears: absent: As Per HPI, Decreased Hearing, Ear Discharge, Ear Pain, Tinnitus, 

Abnormal Hearing, Disequilibrium, Dizziness, Other


Nose/Mouth/Throat: absent: As Per HPI, Epistaxis, Nasal Congestion, Nasal 

Discharge, Nasal Obstruction, Nasal Trauma, Nose Pain, Post Nasal Drip, Sinus 

Pain, Sinus Pressure, Bleeding Gums, Change in Voice, Dental Pain, Dry Mouth, 

Dysphagia, Halitosis, Hoarsness, Lip Swelling, Mouth Lesions, Mouth Pain, 

Odynophagia, Sore Throat, Throat Swelling, Tongue Swelling, Facial Pain, Neck 

Pain, Neck Mass, Other





- Cardiovascular


Cardiovascular: As Per HPI





- Respiratory


Respiratory: absent: As Per HPI, Cough, Dyspnea, Hemoptysis, Dyspnea on 

Exertion, Wheezing, Snoring, Stridor, Pain on Inspiration, Chest Congestion, 

Excessive Mucous Production, Change in Mucous Color, Pain with Coughing, Other





- Genitourinary


Genitourinary: As Per HPI





- Musculoskeletal


Musculoskeletal: absent: As Per HPI, Abnormal Gait, Arthralgias, Atrophy, Back 

Pain, Deformity, Joint Swelling, Limited Range of Motion, Loss of Height, Muscle

Cramps, Muscle Weakness, Myalgias, Neck Pain, Numbness, Radiating Pain into 

Limb, Stiffness, Tingling, Other





- Integumentary


Integumentary: absent: As Per HPI, Acne, Alopecia, Bleeding Lesions, Change in 

Hair, Change in Nails, Change in Pigmentation, Changing Lesions, Dry Skin, 

Erythema, Furuncle, Hirsutism, Lesions, New Lesions, Non-Healing Lesions, 

Photosensitivity, Pruritus, Rash, Skin Pain, Skin Ulcer, Sores, Striae, 

Swelling, Unusual Bruising, Wounds, Jaundice, Other





- Neurological


Neurological: As Per HPI





- Psychiatric


Psychiatric: absent: As Per HPI, Abnormal Sleep Pattern, Anhedonia, Anxiety, 

Auditory Hallucinations, Behavioral Changes, Change in Appetite, Change in 

Libido, Confusion, Depression, Difficulty Concentrating, Hallucinations, 

Homicidal Ideation, Hopelessness, Irritability, Memory Loss, Mood Swings, Panic 

Attacks, Paranoia, Suicidal Ideation, Visual Hallucinations, Tactile 

Hallucinations, Other





- Endocrine


Endocrine: absent: As Per HPI, Change in Body Appearance, Change in Libido, Cold

Intolorance, Deepening of Voice, Excessive Sweating, Fatigue, Flushing, Heat 

Intolorance, Increase in Ring/Shoe/Hat Size, Palpitations, Polydipsia, 

Polyphagia, Polyuria, Other





- Hematologic/Lymphatic


Hematologic: absent: As Per HPI, Easy Bleeding, Easy Bruising, Lymphadenopathy, 

Other





Past Patient History





- Past Medical History & Family History


Past Medical History?: Yes





- Past Social History


Smoking Status: Never Smoked





- CARDIAC


Hx Cardiac Disorders: Yes (HTN, afib)





- PULMONARY


Hx Respiratory Disorders: Yes (COPD)





- NEUROLOGICAL


Hx Neurological Disorder: Yes (CVA)





- HEENT


Hx HEENT Problems: Yes (glaucoma)





- RENAL


Hx Chronic Kidney Disease: Yes





- ENDOCRINE/METABOLIC


Hx Endocrine Disorders: No





- HEMATOLOGICAL/ONCOLOGICAL


Hx Blood Disorders: Yes (bladder ca)


Hx AIDS: No


Hx Human Immunodeficiency Virus (HIV): No





- INTEGUMENTARY


Hx Dermatological Problems: No





- MUSCULOSKELETAL/RHEUMATOLOGICAL


Hx Musculoskeletal Disorders: Yes (osteoarthritis)


Hx Falls: Yes





- GASTROINTESTINAL


Hx Gastrointestinal Disorders: No





- GENITOURINARY/GYNECOLOGICAL


Hx Genitourinary Disorders: Yes (kidney stones, bladder ca)





- PSYCHIATRIC


Hx Psychophysiologic Disorder: No


Hx Substance Use: No





- SURGICAL HISTORY


Hx Surgeries: Yes


Other/Comment: cystoscopy bladder bx ful





- ANESTHESIA


Hx Anesthesia: Yes


Hx Anesthesia Reactions: Yes (tachycardia during colonoscopy)


Hx Malignant Hyperthermia: No





Meds


Allergies/Adverse Reactions: 


                                    Allergies











Allergy/AdvReac Type Severity Reaction Status Date / Time


 


No Known Allergies Allergy   Verified 01/09/19 12:32














- Medications


Medications: 


                               Current Medications





Docusate Sodium (Colace)  100 mg PO BID PRN


   PRN Reason: Constipation


Latanoprost (Xalatan Opht)  1 drop OU HS [facility]


   Last Admin: 01/09/19 23:23 Dose:  Not Given


Losartan Potassium (Cozaar)  100 mg PO DAILY [facility]


Metoprolol Succinate (Toprol Xl)  100 mg PO DAILY [facility]


Prednisolone Acetate (Pred Forte 1% Opht Susp)  1 drop OU QID [facility]


   Last Admin: 01/09/19 23:23 Dose:  1 drop











Physical Exam





- Constitutional


Appears: Well





- Head Exam


Head Exam: ATRAUMATIC, NORMAL INSPECTION, NORMOCEPHALIC





- Eye Exam


Eye Exam: EOMI, Normal appearance, PERRL


Pupil Exam: NORMAL ACCOMODATION, PERRL





- ENT Exam


ENT Exam: Mucous Membranes Moist, Normal Exam





- Neck Exam


Neck exam: Positive for: Normal Inspection





- Respiratory Exam


Respiratory Exam: Clear to Auscultation Bilateral, NORMAL BREATHING PATTERN





- Cardiovascular Exam


Cardiovascular Exam: REGULAR RHYTHM, +S1, +S2





- GI/Abdominal Exam


GI & Abdominal Exam: Normal Bowel Sounds, Soft.  absent: Tenderness





- Rectal Exam


Rectal Exam: Deferred





- Extremities Exam


Extremities exam: Positive for: normal inspection





- Back Exam


Back exam: NORMAL INSPECTION





- Neurological Exam


Neurological exam: Abnormal Gait, Alert, CN II-XII Intact, Oriented x3, Reflexes

Normal


Additional comments: 





Left side pronator drift and difficulty with fine motor movements. 





- Psychiatric Exam


Psychiatric exam: Normal Affect, Normal Mood





- Skin


Skin Exam: Dry, Intact, Normal Color, Warm





Results





- Vital Signs


Recent Vital Signs: 


                                Last Vital Signs











Temp  97.8 F   01/10/19 05:38


 


Pulse  92 H  01/10/19 05:38


 


Resp  18   01/10/19 05:38


 


BP  142/90   01/10/19 05:38


 


Pulse Ox  97   01/10/19 05:38














- Labs


Result Diagrams: 


                                 01/10/19 04:25





                                 01/10/19 04:25


Labs: 


                         Laboratory Results - last 24 hr











  01/09/19 01/09/19 01/09/19





  14:21 14:21 14:21


 


WBC  8.2  


 


RBC  4.93  


 


Hgb  14.4  


 


Hct  43.9  


 


MCV  89.0  


 


MCH  29.2  


 


MCHC  32.8 L  


 


RDW  16.5 H  


 


Plt Count  181  


 


MPV  9.2  


 


Neut % (Auto)  73.0  


 


Lymph % (Auto)  19.0 L  


 


Mono % (Auto)  6.3  


 


Eos % (Auto)  0.5  


 


Baso % (Auto)  1.2  


 


Neut # (Auto)  6.0  


 


Lymph # (Auto)  1.6  


 


Mono # (Auto)  0.5  


 


Eos # (Auto)  0.0  


 


Baso # (Auto)  0.1  


 


PT    22.8 H


 


INR    2.0


 


APTT    41.6 H


 


Sodium   143 


 


Potassium   4.1 


 


Chloride   106 


 


Carbon Dioxide   23 


 


Anion Gap   18 


 


BUN   19 


 


Creatinine   1.5 


 


Est GFR ( Amer)   55 


 


Est GFR (Non-Af Amer)   46 


 


Random Glucose   110 


 


Calcium   9.3 


 


Phosphorus   


 


Magnesium   


 


Total Bilirubin   1.3 


 


AST   29 


 


ALT   39 


 


Alkaline Phosphatase   92 


 


Total Creatine Kinase   187 H 


 


Troponin I   0.0200 


 


Total Protein   7.0 


 


Albumin   3.9 


 


Globulin   3.1 


 


Albumin/Globulin Ratio   1.3 


 


Triglycerides   


 


Cholesterol   


 


LDL Cholesterol Direct   


 


HDL Cholesterol   


 


Prostate Specific Ag   


 


Vitamin B12   


 


25-OH Vitamin D Total   


 


TSH 3rd Generation   0.81 


 


Urine Color   


 


Urine Clarity   


 


Urine pH   


 


Ur Specific Gravity   


 


Urine Protein   


 


Urine Glucose (UA)   


 


Urine Ketones   


 


Urine Blood   


 


Urine Nitrate   


 


Urine Bilirubin   


 


Urine Urobilinogen   


 


Ur Leukocyte Esterase   


 


Urine RBC (Auto)   


 


Urine Microscopic WBC   


 


Hyaline Casts   


 


RPR   














  01/09/19 01/09/19 01/09/19





  16:30 16:49 16:49


 


WBC   


 


RBC   


 


Hgb   


 


Hct   


 


MCV   


 


MCH   


 


MCHC   


 


RDW   


 


Plt Count   


 


MPV   


 


Neut % (Auto)   


 


Lymph % (Auto)   


 


Mono % (Auto)   


 


Eos % (Auto)   


 


Baso % (Auto)   


 


Neut # (Auto)   


 


Lymph # (Auto)   


 


Mono # (Auto)   


 


Eos # (Auto)   


 


Baso # (Auto)   


 


PT   


 


INR   


 


APTT   


 


Sodium   


 


Potassium   


 


Chloride   


 


Carbon Dioxide   


 


Anion Gap   


 


BUN   


 


Creatinine   


 


Est GFR ( Amer)   


 


Est GFR (Non-Af Amer)   


 


Random Glucose   


 


Calcium   


 


Phosphorus   


 


Magnesium   


 


Total Bilirubin   


 


AST   


 


ALT   


 


Alkaline Phosphatase   


 


Total Creatine Kinase   


 


Troponin I   


 


Total Protein   


 


Albumin   


 


Globulin   


 


Albumin/Globulin Ratio   


 


Triglycerides  94  


 


Cholesterol  169  


 


LDL Cholesterol Direct  111  


 


HDL Cholesterol  46  


 


Prostate Specific Ag   


 


Vitamin B12  364  


 


25-OH Vitamin D Total    19.4 L


 


TSH 3rd Generation   


 


Urine Color   


 


Urine Clarity   


 


Urine pH   


 


Ur Specific Gravity   


 


Urine Protein   


 


Urine Glucose (UA)   


 


Urine Ketones   


 


Urine Blood   


 


Urine Nitrate   


 


Urine Bilirubin   


 


Urine Urobilinogen   


 


Ur Leukocyte Esterase   


 


Urine RBC (Auto)   


 


Urine Microscopic WBC   


 


Hyaline Casts   


 


RPR   Nonreactive 














  01/09/19 01/09/19 01/10/19





  17:19 19:05 04:25


 


WBC    6.2


 


RBC    4.80


 


Hgb    14.1


 


Hct    42.7


 


MCV    89.0


 


MCH    29.4


 


MCHC    33.0


 


RDW    16.2 H


 


Plt Count    163


 


MPV    9.3


 


Neut % (Auto)    63.1


 


Lymph % (Auto)    26.5


 


Mono % (Auto)    8.5


 


Eos % (Auto)    1.1


 


Baso % (Auto)    0.8


 


Neut # (Auto)    3.9


 


Lymph # (Auto)    1.7


 


Mono # (Auto)    0.5


 


Eos # (Auto)    0.1


 


Baso # (Auto)    0.0


 


PT   


 


INR   


 


APTT   


 


Sodium   


 


Potassium   


 


Chloride   


 


Carbon Dioxide   


 


Anion Gap   


 


BUN   


 


Creatinine   


 


Est GFR ( Amer)   


 


Est GFR (Non-Af Amer)   


 


Random Glucose   


 


Calcium   


 


Phosphorus   


 


Magnesium   


 


Total Bilirubin   


 


AST   


 


ALT   


 


Alkaline Phosphatase   


 


Total Creatine Kinase   


 


Troponin I   


 


Total Protein   


 


Albumin   


 


Globulin   


 


Albumin/Globulin Ratio   


 


Triglycerides   


 


Cholesterol   


 


LDL Cholesterol Direct   


 


HDL Cholesterol   


 


Prostate Specific Ag   4.25 H 


 


Vitamin B12   


 


25-OH Vitamin D Total   


 


TSH 3rd Generation   


 


Urine Color  Yellow  


 


Urine Clarity  Slighty-cloudy  


 


Urine pH  5.0  


 


Ur Specific Gravity  1.023  


 


Urine Protein  30  


 


Urine Glucose (UA)  Neg  


 


Urine Ketones  Trace  


 


Urine Blood  Moderate  


 


Urine Nitrate  Negative  


 


Urine Bilirubin  Negative  


 


Urine Urobilinogen  0.2-1.0  


 


Ur Leukocyte Esterase  Neg  


 


Urine RBC (Auto)  24 H  


 


Urine Microscopic WBC  3  


 


Hyaline Casts  6-10 H  


 


RPR   














  01/10/19 01/10/19





  04:25 04:25


 


WBC  


 


RBC  


 


Hgb  


 


Hct  


 


MCV  


 


MCH  


 


MCHC  


 


RDW  


 


Plt Count  


 


MPV  


 


Neut % (Auto)  


 


Lymph % (Auto)  


 


Mono % (Auto)  


 


Eos % (Auto)  


 


Baso % (Auto)  


 


Neut # (Auto)  


 


Lymph # (Auto)  


 


Mono # (Auto)  


 


Eos # (Auto)  


 


Baso # (Auto)  


 


PT  24.6 H 


 


INR  2.2 


 


APTT  45.0 H 


 


Sodium   144


 


Potassium   4.8


 


Chloride   110 H


 


Carbon Dioxide   26


 


Anion Gap   13


 


BUN   22 H


 


Creatinine   1.4


 


Est GFR ( Amer)   60


 


Est GFR (Non-Af Amer)   49


 


Random Glucose   101


 


Calcium   9.4


 


Phosphorus   3.3


 


Magnesium   2.3


 


Total Bilirubin   1.4 H


 


AST   28


 


ALT   42


 


Alkaline Phosphatase   93


 


Total Creatine Kinase  


 


Troponin I  


 


Total Protein   6.9


 


Albumin   3.8


 


Globulin   3.1


 


Albumin/Globulin Ratio   1.2


 


Triglycerides  


 


Cholesterol  


 


LDL Cholesterol Direct  


 


HDL Cholesterol  


 


Prostate Specific Ag  


 


Vitamin B12  


 


25-OH Vitamin D Total  


 


TSH 3rd Generation  


 


Urine Color  


 


Urine Clarity  


 


Urine pH  


 


Ur Specific Gravity  


 


Urine Protein  


 


Urine Glucose (UA)  


 


Urine Ketones  


 


Urine Blood  


 


Urine Nitrate  


 


Urine Bilirubin  


 


Urine Urobilinogen  


 


Ur Leukocyte Esterase  


 


Urine RBC (Auto)  


 


Urine Microscopic WBC  


 


Hyaline Casts  


 


RPR  














Assessment & Plan


(1) Brain mass


Assessment and Plan: 


The hypodensity is well circumscribed and appears to have mass effect.  It is 

less likely to be an infarct and more likely to be a mass; however, an MRI of 

the brain with and without contrast will help to differentiate.  Will continue 

coumadin for now to maintain therapeutic INR.  Continue conservative management,

PT/OT eval and treatment.  Neurology will follow.  


Status: Acute

## 2019-01-10 NOTE — MRI
Date of service: 



01/10/2019



PROCEDURE:  MRI BRAIN WITH AND WITHOUT CONTRAST



HISTORY:

L weakness, abnormal CT r/o mass



COMPARISON:

Noncontrast head CT from 01/09/2019. 



TECHNIQUE:

Multiplanar, multisequence MR images of the brain were obtained with 

and without intravenous contrast enhancement. 15 cc Omniscan was 

injected intravenously. 



FINDINGS:



HEMORRHAGE:

None



DWI:

No evidence of an acute or early subacute infarction.



BRAIN PARENCHYMA:

There is a 5.7 X 4.4 X 5.2 CM T1 hypointense and T2/FLAIR 

hyperintense mass with irregular shaggy peripheral rim enhancement in 

the right periatrial white matter also involving the right, midline 

and left paramedian splenium of corpus callosum.  There is T2/FLAIR 

hyperintense signal surrounding the mass also extending into the left 

Yolanda atrial periventricular white matter.  There are curvilinear foci 

of T2 hypo intense signal peripherally in the mass which corresponds 

to increased magnetic susceptibility on gradient sequences. 



There are moderate chronic microangiopathic changes.  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 



ENHANCEMENT:

There is no abnormal leptomeningeal enhancement.



VENTRICLES:

There is mild age-related global parenchymal volume loss and 

proportionate enlargement of the ventricles and cortical sulci. 



CRANIUM:

There is normal bone marrow signal pattern.



ORBITS:

The right globe is normal.  Status post left globe prosthesis.



PARANASAL SINUSES/MASTOIDS:

There is mild mucosal thickening in paranasal sinuses, worse in the 

maxillary sinuses and trace mastoid effusions.



VASCULAR SYSTEM:

There are normal signal voids in the larger intracranial arteries. 



OTHER FINDINGS:

None .



IMPRESSION:

1.  Approximately 5.7 x 4.4 x 5.2 cm mass with peripheral irregular 

shaggy rim enhancement and peripheral foci of petechial hemorrhage in 

the right periatrial periventricular white matter also involving the 

right midline and left paramedian splenium of corpus callosum.  

Abnormal signal surrounding the mass also extending into the left 

periatrial periventricular white matter may represent edema or 

nonenhancing tumor.  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 

glioblastoma (butterfly glioma).  The other less likely differential 

considerations include primary CNS lymphoma in an immunocompromised 

patient, cerebral toxoplasmosis in an immunocompromised patient and 

tumefactive demyelination. 



2.  Moderate chronic microangiopathic changes and mild age-related 

global parenchymal volume loss. 



Critical findings were discussed with Dr. Sesar Singh and Dr. Gen Meneses on 01/10/2019 after the scan was performed.

## 2019-01-10 NOTE — CP.PCM.PN
<Mo Steven - Last Filed: 01/10/19 12:08>





Subjective





- Date & Time of Evaluation


Date of Evaluation: 01/10/19


Time of Evaluation: 11:42





- Subjective


Subjective: 


77 y/o male patient with PMHx of Cardiac Arrhythmias (A-Fib), COPD, HTN, Bladder

cancer, and Chronic Kidney Disease was seen and evaluated at bedside due to 

complaints of multiple falls and with L-sided weakness. Patient states he has 

been experiencing multiple falls as he feels unsteady and imbalanced. Patient is

AAOx3. Patient family present at bedside. At this time, patient denies 

headaches, dizziness, fever, nausea, vomiting, abdominal pain, shortness of bruno

th, chest pain or any urinary complaints.








Objective





- Vital Signs/Intake and Output


Vital Signs (last 24 hours): 


                                        











Temp Pulse Resp BP Pulse Ox


 


 97.7 F   84   20   165/90 H  96 


 


 01/10/19 08:11  01/10/19 11:00  01/10/19 08:11  01/10/19 11:00  01/10/19 08:11











- Medications


Medications: 


                               Current Medications





Docusate Sodium (Colace)  100 mg PO BID PRN


   PRN Reason: Constipation


Latanoprost (Xalatan Opht)  1 drop OU HS [facility]


   Last Admin: 01/09/19 23:23 Dose:  Not Given


Losartan Potassium (Cozaar)  100 mg PO DAILY [facility]


   Last Admin: 01/10/19 11:00 Dose:  100 mg


Metoprolol Succinate (Toprol Xl)  100 mg PO DAILY [facility]


   Last Admin: 01/10/19 11:00 Dose:  100 mg


Prednisolone Acetate (Pred Forte 1% Opht Susp)  1 drop OU QID [facility]


   Last Admin: 01/10/19 10:58 Dose:  1 drop











- Labs


Labs: 


                                        





                                 01/10/19 04:25 





                                 01/10/19 04:25 





                                        











PT  24.6 Seconds (9.8-13.1)  H  01/10/19  04:25    


 


INR  2.2   01/10/19  04:25    


 


APTT  45.0 Seconds (25.6-37.1)  H  01/10/19  04:25    














- Constitutional


Appears: Well, Non-toxic, No Acute Distress





- Head Exam


Head Exam: ATRAUMATIC, NORMOCEPHALIC





- Eye Exam


Eye Exam: Normal appearance, PERRL





- ENT Exam


ENT Exam: Mucous Membranes Moist, Normal Exam


Additional comments: 


Left eye blindness








- Neck Exam


Neck Exam: Full ROM.  absent: Lymphadenopathy





- Respiratory Exam


Respiratory Exam: Clear to Ausculation Bilateral, NORMAL BREATHING PATTERN.  

absent: Rales, Rhonchi, Wheezes





- Cardiovascular Exam


Cardiovascular Exam: Irregular Rhythm





- GI/Abdominal Exam


GI & Abdominal Exam: Soft, Normal Bowel Sounds.  absent: Firm, Guarding, Rigid





- Extremities Exam


Extremities Exam: absent: Calf Tenderness, Pedal Edema





- Neurological Exam


Neurological Exam: Alert, Awake, Oriented x3





- Psychiatric Exam


Psychiatric exam: Normal Affect, Normal Mood





- Skin


Skin Exam: Normal Color





Assessment and Plan





- Assessment and Plan (Free Text)


Assessment: 


77 y/o male with PMHx of Cardiac Arrhythmias (A-Fib), COPD, HTN, Kidney Stones, 

and Chronic Kidney Disease admitted for frequent falls with hx of left sided 

weakness, r/o brain mass and/or hemorrhage, findings suspicious on Head CT. MRI 

was recommended 





Plan: 


Frequent Falls


- acute, r/o brain mass and/or hemorrhage, findings suspicious on Head CT


- Head CT: mass like lesion with eccentric small foci of hemorrhage/slow flow in

blood vessels in the right posterior centrum semiovale and parietal lobe 

involving the right splenium of corpus callosum, MRI recommended 


- Brain MRI- Pending read


- Hold Coumadin at this time


- CXR: no active disease


- Neurology Consult, Dr. Meneses- recommendations appreciated


- Neuro Checks





Left Flank Pain with bruising


- acute, symptomatic 


- Abd/Pelvic CT: no acute abdominal or pelvic abnormality, no evidence 

intraperitoneal or retroperitoneal hemorrhage 





Atrial Fibrillation


- chronic, rate-controlled


- patient on Warfarin 3mg and 4 mg tabs alternatively


- Hold Coumadin at this time





HTN


- chronic, controlled


- continue home meds 





Left Sided Blindness


- chronic


- continue home meds





Diet


- Heart Healthy Diet





DVT Prophylaxis


- SCDs for now





Code Status


- Unknown Full Code








<Patricio Drake D - Last Filed: 01/10/19 13:09>





Objective





- Vital Signs/Intake and Output


Vital Signs (last 24 hours): 


                                        











Temp Pulse Resp BP Pulse Ox


 


 97.7 F   84   20   165/90 H  96 


 


 01/10/19 08:11  01/10/19 11:00  01/10/19 08:11  01/10/19 11:00  01/10/19 08:11











- Medications


Medications: 


                               Current Medications





Docusate Sodium (Colace)  100 mg PO BID PRN


   PRN Reason: Constipation


Latanoprost (Xalatan Opht)  1 drop OU HS [facility]


   Last Admin: 01/09/19 23:23 Dose:  Not Given


Losartan Potassium (Cozaar)  100 mg PO DAILY [facility]


   Last Admin: 01/10/19 11:00 Dose:  100 mg


Metoprolol Succinate (Toprol Xl)  100 mg PO DAILY [facility]


   Last Admin: 01/10/19 11:00 Dose:  100 mg


Prednisolone Acetate (Pred Forte 1% Opht Susp)  1 drop OU QID [facility]


   Last Admin: 01/10/19 10:58 Dose:  1 drop











- Labs


Labs: 


                                        





                                 01/10/19 04:25 





                                 01/10/19 04:25 





                                        











PT  24.6 Seconds (9.8-13.1)  H  01/10/19  04:25    


 


INR  2.2   01/10/19  04:25    


 


APTT  45.0 Seconds (25.6-37.1)  H  01/10/19  04:25    














Attending/Attestation





- Attestation


I have personally seen and examined this patient.: Yes


I have fully participated in the care of the patient.: Yes


I have reviewed all pertinent clinical information, including history, physical 

exam and plan: Yes


Notes (Text): 





01/10/19 13:08


Patient seen and examined with resident. Case discussed and agreed with 

assessment and plan.

## 2019-01-10 NOTE — CP.PCM.PN
Subjective





- Date & Time of Evaluation


Date of Evaluation: 01/10/19


Time of Evaluation: 07:30





- Subjective


Subjective: 





NO NEW COMPLAINTS





Objective





- Vital Signs/Intake and Output


Vital Signs (last 24 hours): 


                                        











Temp Pulse Resp BP Pulse Ox


 


 97.7 F   84   20   165/90 H  96 


 


 01/10/19 08:11  01/10/19 08:11  01/10/19 08:11  01/10/19 08:11  01/10/19 08:11











- Medications


Medications: 


                               Current Medications





Docusate Sodium (Colace)  100 mg PO BID PRN


   PRN Reason: Constipation


Latanoprost (Xalatan Opht)  1 drop OU HS [facility]


   Last Admin: 01/09/19 23:23 Dose:  Not Given


Losartan Potassium (Cozaar)  100 mg PO DAILY [facility]


Metoprolol Succinate (Toprol Xl)  100 mg PO DAILY [facility]


Prednisolone Acetate (Pred Forte 1% Opht Susp)  1 drop OU QID [facility]


   Last Admin: 01/09/19 23:23 Dose:  1 drop











- Labs


Labs: 


                                        





                                 01/10/19 04:25 





                                 01/10/19 04:25 





                                        











PT  24.6 Seconds (9.8-13.1)  H  01/10/19  04:25    


 


INR  2.2   01/10/19  04:25    


 


APTT  45.0 Seconds (25.6-37.1)  H  01/10/19  04:25    














- Respiratory Exam


Respiratory Exam: Clear to Ausculation Bilateral





- Cardiovascular Exam


Cardiovascular Exam: Irregular Rhythm, +S1, +S2





- Back Exam


Additional comments: 





NO LE EDEMA





- Additional Findings


Additional findings: 





EKG MONITOR NSR





NEUROLOGY CONSULT REVIEWED





Assessment and Plan





- Assessment and Plan (Free Text)


Assessment: 





RIGHT BRAIN TUMOR VS INFARCT





CHRONIC ATRIAL FIBRILLATION





HYPERTENSION


Plan: 





FOR MRI THIS MORNING FOR BETTER DELINIATION OF BRAIN MASS





CONTINUE METOPROLOL AND LOSARTAN

## 2019-01-10 NOTE — CP.PCM.PN
Subjective





- Date & Time of Evaluation


Date of Evaluation: 01/10/19


Time of Evaluation: 14:40





- Subjective


Subjective: 





Neurology Follow-Up Note:





Mr. Gaines was evaluated this afternoon in bed.  He is still complaining of 

left sided weakness and decreased coordination.  He has noticed no change in his

symptoms since admission.  He admits to using a cane which helps his balance 

when ambulating.  Denies h/a, dizziness, visual changes, chest pain, palp

itations, sob, cough, abd pain, n/v/d.





Objective





- Vital Signs/Intake and Output


Vital Signs (last 24 hours): 


                                        











Temp Pulse Resp BP Pulse Ox


 


 97.6 F   86   20   138/82   94 L


 


 01/10/19 13:21  01/10/19 13:21  01/10/19 13:21  01/10/19 13:21  01/10/19 13:21











- Medications


Medications: 


                               Current Medications





Docusate Sodium (Colace)  100 mg PO BID PRN


   PRN Reason: Constipation


Latanoprost (Xalatan Opht)  1 drop OU HS [facility]


   Last Admin: 01/09/19 23:23 Dose:  Not Given


Losartan Potassium (Cozaar)  100 mg PO DAILY [facility]


   Last Admin: 01/10/19 11:00 Dose:  100 mg


Metoprolol Succinate (Toprol Xl)  100 mg PO DAILY [facility]


   Last Admin: 01/10/19 11:00 Dose:  100 mg


Prednisolone Acetate (Pred Forte 1% Opht Susp)  1 drop OU QID [facility]


   Last Admin: 01/10/19 14:20 Dose:  1 drop











- Labs


Labs: 


                                        





                                 01/10/19 04:25 





                                 01/10/19 04:25 





                                        











PT  24.6 Seconds (9.8-13.1)  H  01/10/19  04:25    


 


INR  2.2   01/10/19  04:25    


 


APTT  45.0 Seconds (25.6-37.1)  H  01/10/19  04:25    














- Constitutional


Appears: Well, Non-toxic, No Acute Distress





- Head Exam


Head Exam: ATRAUMATIC, NORMAL INSPECTION, NORMOCEPHALIC





- Eye Exam


Eye Exam: EOMI, Normal appearance, PERRL


Pupil Exam: NORMAL ACCOMODATION, PERRL





- ENT Exam


ENT Exam: Mucous Membranes Moist





- Neck Exam


Neck Exam: Full ROM, Normal Inspection





- Respiratory Exam


Respiratory Exam: NORMAL BREATHING PATTERN





- Extremities Exam


Extremities Exam: Full ROM, Normal Inspection.  absent: Calf Tenderness, Pedal 

Edema


Additional comments: 





FROM to all extremities, slightly weaker  noted to LUE.





- Neurological Exam


Neurological Exam: Abnormal Gait, Alert, Awake, CN II-XII Intact, Oriented x3, R

eflexes Normal


Neuro motor strength exam: Left Upper Extremity: 5 ( 4/5), Right Upper 

Extremity: 5, Left Lower Extremity: 5 (dorsiflexion 4/5), Right Lower Extremity:

5


Additional comments: 





speech clear, fluid


[id number]


follows all commands


sensation intact and equal


fine motor deficits noted


+ ataxia noted to LUE during finger-to-nose test


gait not assessed; PT notes reviewed.





- Psychiatric Exam


Psychiatric exam: Normal Affect, Normal Mood





- Skin


Skin Exam: Normal Color





Assessment and Plan


(1) Brain mass


Assessment & Plan: 


Imaging reviewed:





-MRI Brain (1/10/19): 1.  Approximately 5.7 x 4.4 x 5.2 cm mass with peripheral 

irregular shaggy rim enhancement and peripheral foci of petechial hemorrhage in 

the right periatrial periventricular white matter also involving the right 

midline and left paramedian splenium of corpus callosum.  Abnormal signal 

surrounding the mass also extending into the left periatrial periventricular 

white matter may represent edema or nonenhancing tumor.  The imaging appearance 

is most consistent with glioblastoma (butterfly glioma).  The other less likely 

differential considerations include primary CNS lymphoma in an immunocompromised

patient, cerebral toxoplasmosis in an immunocompromised patient and tumefactive 

demyelination.  2.  Moderate chronic microangiopathic changes and mild age-

related global parenchymal volume loss. 


-CT Head (1/9/19): 1.  Apparent mass like lesion with eccentric small foci of 

hemorrhage/slow flow in blood vessels in the right posterior centrum semiovale 

and parietal lobe also involving the right splenium of corpus callosum with mass

effect and effacement of the atrium of the right lateral ventricle.  The 

differential considerations include benign and malignant primary or secondary 

neoplasm, and nonspecific infection/inflammation.  An MRI of the brain without 

and with intravenous contrast is recommended for further characterization.  2.  

Moderate chronic microangiopathic changes and mild age-related global 

parenchymal volume loss. 





-Recommend oncology and neurosurgery evaluation


-Coumadin on hold per primary team. Continue to monitor INR daily.


-Continue PT/OT


-I had a lengthy discussion with the pt regarding his MRI results and what the 

neuro team is recommending.  He is in agreement with the plan.


-Notify neuro of any acute changes in patient's condition.





Case discussed with Dr. Meneses


Status: Acute

## 2019-01-11 VITALS — HEART RATE: 111 BPM | DIASTOLIC BLOOD PRESSURE: 87 MMHG | TEMPERATURE: 97.4 F | SYSTOLIC BLOOD PRESSURE: 150 MMHG

## 2019-01-11 VITALS — RESPIRATION RATE: 20 BRPM | OXYGEN SATURATION: 95 %

## 2019-01-11 LAB
APTT BLD: 39.5 SECONDS (ref 25.6–37.1)
BUN SERPL-MCNC: 21 MG/DL (ref 9–20)
CALCIUM SERPL-MCNC: 9.6 MG/DL (ref 8.4–10.2)
GFR NON-AFRICAN AMERICAN: > 60
INR PPP: 1.9
PROTHROMBIN TIME: 21.6 SECONDS (ref 9.8–13.1)

## 2019-01-11 RX ADMIN — PREDNISOLONE ACETATE SCH DROP: 10 SUSPENSION/ DROPS OPHTHALMIC at 08:41

## 2019-01-11 RX ADMIN — METOPROLOL SUCCINATE SCH MG: 100 TABLET, EXTENDED RELEASE ORAL at 08:42

## 2019-01-11 NOTE — CP.PCM.PN
Subjective





- Date & Time of Evaluation


Date of Evaluation: 01/11/19


Time of Evaluation: 08:45





- Subjective


Subjective: 





NO NEW COMPLAINTS





Objective





- Vital Signs/Intake and Output


Vital Signs (last 24 hours): 


                                        











Temp Pulse Resp BP Pulse Ox


 


 98 F   92 H  20   156/87 H  95 


 


 01/11/19 07:50  01/11/19 08:42  01/11/19 07:50  01/11/19 08:42  01/11/19 07:50











- Medications


Medications: 


                               Current Medications





Dexamethasone (Decadron Inj)  10 mg IV Q8H [facility]


   Last Admin: 01/11/19 08:41 Dose:  10 mg


Docusate Sodium (Colace)  100 mg PO BID PRN


   PRN Reason: Constipation


Latanoprost (Xalatan Opht)  1 drop OU HS [facility]


   Last Admin: 01/10/19 21:07 Dose:  1 drop


Losartan Potassium (Cozaar)  100 mg PO DAILY [facility]


   Last Admin: 01/11/19 08:42 Dose:  100 mg


Metoprolol Succinate (Toprol Xl)  100 mg PO DAILY [facility]


   Last Admin: 01/11/19 08:42 Dose:  100 mg


Prednisolone Acetate (Pred Forte 1% Opht Susp)  1 drop OU QID [facility]


   Last Admin: 01/11/19 08:41 Dose:  1 drop











- Labs


Labs: 


                                        





                                 01/10/19 04:25 





                                 01/11/19 05:00 





                                        











PT  21.6 Seconds (9.8-13.1)  H  01/11/19  05:00    


 


INR  1.9   01/11/19  05:00    


 


APTT  39.5 Seconds (25.6-37.1)  H  01/11/19  05:00    














- Respiratory Exam


Respiratory Exam: Clear to Ausculation Bilateral





- Cardiovascular Exam


Cardiovascular Exam: Irregular Rhythm, +S1, +S2





- Extremities Exam


Additional comments: 





NO LE EDEMA





- Additional Findings


Additional findings: 





MRI ALSO SHOWS RIGHT SIDED BRAIN MASS-CT REPORT REVIEWED





Assessment and Plan





- Assessment and Plan (Free Text)


Assessment: 





RIGHT BRAIN TUMOR WITH LEFT SIDED WEAKNESS





ATRIAL FIBRILLATION





HYPERTENSION


Plan: 





I SPOKE TO AND GAVE THE REPORT TO THE PATIENT AS WELL AS HIS DAUGHTER





THE PATIENT WANTS TO BE DISCHARGED TODAY AND WILL GO TO [place] WITH HIS 

DAUGHTER AND HAVE FURTHER GROVER SUCH AS BRAIN BIOPSY FOR TISSUE DIAGNOSIS AND 

TREATMENT THERE STAYING WITH HIS DAUGHTER

## 2019-01-11 NOTE — PQF
PROVIDER RESPONSE TEXT:



I do not know his previous history but as far as the records show there was only one time when his GF
R went down to 55 since 2015. I will not level him with CKD but if we do, then I would classify him a
s CKD stage 1 or 2.



REVIEWER QUERY TEXT:



Kidney Disease, Chronic CKD Stage



Chronic Kidney Disease (CKD) is documented in the Medical Record.  Please specify the disease stage (
includes probable or suspected)

Such as:

-- Chronic kidney disease Stage 1

-- Chronic kidney disease Stage 2

-- Chronic kidney disease Stage 3

-- Chronic kidney disease Stage 4

-- Chronic kidney disease Stage 5

-- Chronic kidney disease Stage 5, requiring dialysis

-- End Stage Renal Disease

-- Other, please specify



Stages are defined by the [employer] as follows:

CKD Stage I  GFR >= 90 ml / min per 1.73 m2 and persistent albuminuria

CKD Stage 2 GFR between 60 and 89 with persistent albuminuria

CKD Stage 3 GFR between 30 and 59

CKD Stage 4 GFR between 15 and 29

CKD Stage 5 GFR between <15 or End Stage Renal Disease



The patient's Clinical Indicators include:

Documentation of CKD.



BUN 19, 22



Creatinine 1.5, 1.4



GFR ():  55, 60

GFR (Non-):  46, 49





Query created by: Dahiana Davis on 1/10/2019 8:34 AM





Electronically signed by:  Patricio Drake MD 1/11/2019 12:22 PM

## 2019-01-11 NOTE — CP.PCM.DIS
<Conner Hamm - Last Filed: 01/11/19 10:19>





Provider





- Provider


Date of Admission: 


01/09/19 14:48





Attending physician: 


Patricio Drake MD





Consults: 








01/09/19 15:05


Neurology Consult Stat 


   Comment: 


   Consulting Provider: Nic Meneses


   Consulting Physician: Nic Meneses


   Reason for Consult: ongoing L weakness, frequent falls





01/09/19 17:21


Cardiology Consult Stat 


   Comment: 


   Consulting Provider: Prasanna Priest


   Consulting Physician: Prasanna Priest


   Reason for Consult: AFib











Time Spent in preparation of Discharge (in minutes): 30





Diagnosis





- Discharge Diagnosis


(1) Frequent falls


Status: Acute   Priority: High   





Hospital Course





- Lab Results


Lab Results: 


                             Most Recent Lab Values











WBC  6.2 K/uL (4.8-10.8)   01/10/19  04:25    


 


RBC  4.80 Mil/uL (4.40-5.90)   01/10/19  04:25    


 


Hgb  14.1 g/dL (12.0-18.0)   01/10/19  04:25    


 


Hct  42.7 % (35.0-51.0)   01/10/19  04:25    


 


MCV  89.0 fl (80.0-94.0)   01/10/19  04:25    


 


MCH  29.4 pg (27.0-31.0)   01/10/19  04:25    


 


MCHC  33.0 g/dL (33.0-37.0)   01/10/19  04:25    


 


RDW  16.2 % (11.5-14.5)  H  01/10/19  04:25    


 


Plt Count  163 K/uL (130-400)   01/10/19  04:25    


 


MPV  9.3 fl (7.2-11.7)   01/10/19  04:25    


 


Neut % (Auto)  63.1 % (50.0-75.0)   01/10/19  04:25    


 


Lymph % (Auto)  26.5 % (20.0-40.0)   01/10/19  04:25    


 


Mono % (Auto)  8.5 % (0.0-10.0)   01/10/19  04:25    


 


Eos % (Auto)  1.1 % (0.0-4.0)   01/10/19  04:25    


 


Baso % (Auto)  0.8 % (0.0-2.0)   01/10/19  04:25    


 


Neut # (Auto)  3.9 K/uL (1.8-7.0)   01/10/19  04:25    


 


Lymph # (Auto)  1.7 K/uL (1.0-4.3)   01/10/19  04:25    


 


Mono # (Auto)  0.5 K/uL (0.0-0.8)   01/10/19  04:25    


 


Eos # (Auto)  0.1 K/uL (0.0-0.7)   01/10/19  04:25    


 


Baso # (Auto)  0.0 K/uL (0.0-0.2)   01/10/19  04:25    


 


PT  21.6 Seconds (9.8-13.1)  H  01/11/19  05:00    


 


INR  1.9   01/11/19  05:00    


 


APTT  39.5 Seconds (25.6-37.1)  H  01/11/19  05:00    


 


Sodium  135 mmol/l (132-148)   01/11/19  05:00    


 


Potassium  4.3 MMOL/L (3.6-5.0)   01/11/19  05:00    


 


Chloride  106 mmol/L ()   01/11/19  05:00    


 


Carbon Dioxide  23 mmol/L (22-30)   01/11/19  05:00    


 


Anion Gap  10  (10-20)   01/11/19  05:00    


 


BUN  21 mg/dl (9-20)  H  01/11/19  05:00    


 


Creatinine  1.1 mg/dl (0.8-1.5)   01/11/19  05:00    


 


Est GFR ( Amer)  > 60   01/11/19  05:00    


 


Est GFR (Non-Af Amer)  > 60   01/11/19  05:00    


 


Random Glucose  151 mg/dL ()  H  01/11/19  05:00    


 


Calcium  9.6 mg/dL (8.4-10.2)   01/11/19  05:00    


 


Phosphorus  3.3 mg/dl (2.5-4.5)   01/10/19  04:25    


 


Magnesium  2.3 MG/DL (1.6-2.3)   01/10/19  04:25    


 


Total Bilirubin  1.4 mg/dl (0.2-1.3)  H  01/10/19  04:25    


 


AST  28 U/L (17-59)   01/10/19  04:25    


 


ALT  42 U/L (21-72)   01/10/19  04:25    


 


Alkaline Phosphatase  93 U/L ()   01/10/19  04:25    


 


Total Creatine Kinase  187 U/L ()  H  01/09/19  14:21    


 


Troponin I  0.0200 ng/mL (0.00-0.120)   01/09/19  14:21    


 


Total Protein  6.9 G/DL (6.3-8.2)   01/10/19  04:25    


 


Albumin  3.8 g/dL (3.5-5.0)   01/10/19  04:25    


 


Globulin  3.1 gm/dL (2.2-3.9)   01/10/19  04:25    


 


Albumin/Globulin Ratio  1.2  (1.0-2.1)   01/10/19  04:25    


 


Triglycerides  94 mg/DL (0-149)   01/09/19  16:30    


 


Cholesterol  169 mg/dL (0-199)   01/09/19  16:30    


 


LDL Cholesterol Direct  111 mg/dL (0-129)   01/09/19  16:30    


 


HDL Cholesterol  46 MG/DL (30-70)   01/09/19  16:30    


 


Prostate Specific Ag  4.25 ng/ML (0.00-4.0)  H  01/09/19  19:05    


 


Vitamin B12  364 pg/mL (239-931)   01/09/19  16:30    


 


25-OH Vitamin D Total  19.4 NG/ML (30.0-100.0)  L  01/09/19  16:49    


 


TSH 3rd Generation  0.81 mIU/ML (0.46-4.68)   01/09/19  14:21    


 


Urine Color  Yellow  (YELLOW)   01/09/19  17:19    


 


Urine Clarity  Slighty-cloudy  (Clear)   01/09/19  17:19    


 


Urine pH  5.0  (5.0-8.0)   01/09/19  17:19    


 


Ur Specific Gravity  1.023  (1.003-1.030)   01/09/19  17:19    


 


Urine Protein  30 mg/dL (NEGATIVE)   01/09/19  17:19    


 


Urine Glucose (UA)  Neg mg/dL (NEGATIVE)   01/09/19  17:19    


 


Urine Ketones  Trace mg/dL (NEGATIVE)   01/09/19  17:19    


 


Urine Blood  Moderate  (NEGATIVE)   01/09/19  17:19    


 


Urine Nitrate  Negative  (NEGATIVE)   01/09/19  17:19    


 


Urine Bilirubin  Negative  (NEGATIVE)   01/09/19  17:19    


 


Urine Urobilinogen  0.2-1.0 mg/dL (0.2-1.0)   01/09/19  17:19    


 


Ur Leukocyte Esterase  Neg Kwabena/uL (Negative)   01/09/19  17:19    


 


Urine RBC (Auto)  24 /hpf (0-3)  H  01/09/19  17:19    


 


Urine Microscopic WBC  3 /hpf (0-5)   01/09/19  17:19    


 


Hyaline Casts  6-10 /hpf (0-2)  H  01/09/19  17:19    


 


RPR  Nonreactive  (NONREACTIVE)   01/09/19  16:49    


 


HIV 1&2 Ag/Ab, 4th Gen  Nonreactive  (Nonreactive)   01/09/19  16:20    














- Hospital Course


Hospital Course: 








77 y/o M patient with PMH of Cardiac Arrhythmias (A-Fib), COPD, HTN, Bladder 

cancer, and Chronic Kidney Disease admitted to the hospital for multiple falls 

and with L-sided weakness. CT brain and MRI were done for the patient which 

reveals a mass in the parietal lobe and corpus callosum 5.7X4.4X5.2 with mass 

effect on the R lateral ventricle with a surrounding hazy area might represent a

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 

toxoplasmosis in immunocompromised patients. Patient was on Decadron 10 mg Q8 IV

in the hospital Patient seen, evaluated and treated by OT. During his hospital 

course; Patient didn't have any acute events or distress. Patient discharged 

home today upon his own desire to continue his treatment at [place] where

his family stays. Patient will be on Decadrone 10 mg PO Q8 and Pepcid 40 mg PO 

QD. Coumadin will be held until further discussed with the patient cardiology 

doctor and his primary doctor. Patient will follow up with neurology and 

neurosurgery at [place] upon discharge.








Assessment: 


77 y/o male with PMHx of Cardiac Arrhythmias (A-Fib), COPD, HTN, Kidney Stones, 

and Chronic Kidney Disease admitted for frequent falls with hx of left sided 

weakness, brain mass on the MRI.








Plan: 





Frequent Falls, Left side weakness and Brain mass


- Acute.


- Head CT: mass like lesion with eccentric small foci of hemorrhage/slow flow in

blood vessels in the right posterior centrum semiovale and parietal lobe 

involving the right splenium of corpus callosum, MRI recommended 


- Brain MRI: A mass in the parietal lobe and corpus callosum 5.7X4.4X5.2 with 

mass effect on the R lateral ventricle with a surrounding heazy area might 

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 

lymphoma or toxoplasmosis in immunocompromised patients.


- Continue Holding Coumadin until further discussed with the patient cardiology 

doctor and his primary doctor as the MRI shows areas of hemorrhages inside the 

brain mass


- CXR: no active disease


- Neurology Consult, Dr. Meneses: (Continue Decadron 10 mg PO Q8 upon discharge, 

Pepcid 40 mg PO QD for GI prophylaxis)


- Patient decide to leave the hospital and continue his management at [place] where his family stays.


- Patient advised to follow up with neurosurgeon and neurologist at [place] 

[place].


- All the treatment alternatives explained to the patient and his daughter.


- Patient and his daughter expressed verbal understanding.

















Discharge Exam





- Head Exam


Head Exam: ATRAUMATIC, NORMAL INSPECTION, NORMOCEPHALIC





- Eye Exam


Eye Exam: EOMI, Normal appearance, PERRL


Pupil Exam: NORMAL ACCOMODATION, PERRL





- ENT Exam


ENT Exam: Mucous Membranes Moist





- Neck Exam


Neck exam: Full Rom, Normal Inspection





- Respiratory Exam


Respiratory Exam: Clear to PA & Lateral, NORMAL BREATHING PATTERN, UNREMARKABLE





- Cardiovascular Exam


Cardiovascular Exam: REGULAR RHYTHM, +S1, +S2





- GI/Abdominal Exam


GI & Abdominal Exam: Normal Bowel Sounds, Unremarkable





- Extremities Exam


Extremities exam: normal capillary refill





- Back Exam


Back exam: NORMAL INSPECTION





- Neurological Exam


Neurological exam: Alert, Oriented x3





- Psychiatric Exam


Psychiatric exam: Normal Affect, Normal Mood





- Skin


Skin Exam: Dry, Intact, Normal Color





Discharge Plan





- Discharge Medications


Prescriptions: 


Dexamethasone [Decadron] 4 mg PO Q8 #42 tab


Dexamethasone [Decadron] 6 mg PO Q8 #42 tablet


Famotidine [Pepcid] 40 mg PO QAM #14 tablet





- Follow Up Plan


Condition: FAIR


Disposition: AGAINST MEDICAL ADVICE


Instructions:  Preventing Falls in the Older Adult, Generalized Weakness (DC)


Additional Instructions: 


follow up with neurology in [place]





<Patricio Drake - Last Filed: 01/11/19 11:24>





Provider





- Provider


Date of Admission: 


01/09/19 14:48





Attending physician: 


Patricio Drake MD





Consults: 








01/09/19 15:05


Neurology Consult Stat 


   Comment: 


   Consulting Provider: Nic Meneses


   Consulting Physician: Nic Meneses


   Reason for Consult: ongoing L weakness, frequent falls





01/09/19 17:21


Cardiology Consult Stat 


   Comment: 


   Consulting Provider: Prasanna Priest


   Consulting Physician: Prasanna Priest


   Reason for Consult: [facility]














Hospital Course





- Lab Results


Lab Results: 


                             Most Recent Lab Values











WBC  6.2 K/uL (4.8-10.8)   01/10/19  04:25    


 


RBC  4.80 Mil/uL (4.40-5.90)   01/10/19  04:25    


 


Hgb  14.1 g/dL (12.0-18.0)   01/10/19  04:25    


 


Hct  42.7 % (35.0-51.0)   01/10/19  04:25    


 


MCV  89.0 fl (80.0-94.0)   01/10/19  04:25    


 


MCH  29.4 pg (27.0-31.0)   01/10/19  04:25    


 


MCHC  33.0 g/dL (33.0-37.0)   01/10/19  04:25    


 


RDW  16.2 % (11.5-14.5)  H  01/10/19  04:25    


 


Plt Count  163 K/uL (130-400)   01/10/19  04:25    


 


MPV  9.3 fl (7.2-11.7)   01/10/19  04:25    


 


Neut % (Auto)  63.1 % (50.0-75.0)   01/10/19  04:25    


 


Lymph % (Auto)  26.5 % (20.0-40.0)   01/10/19  04:25    


 


Mono % (Auto)  8.5 % (0.0-10.0)   01/10/19  04:25    


 


Eos % (Auto)  1.1 % (0.0-4.0)   01/10/19  04:25    


 


Baso % (Auto)  0.8 % (0.0-2.0)   01/10/19  04:25    


 


Neut # (Auto)  3.9 K/uL (1.8-7.0)   01/10/19  04:25    


 


Lymph # (Auto)  1.7 K/uL (1.0-4.3)   01/10/19  04:25    


 


Mono # (Auto)  0.5 K/uL (0.0-0.8)   01/10/19  04:25    


 


Eos # (Auto)  0.1 K/uL (0.0-0.7)   01/10/19  04:25    


 


Baso # (Auto)  0.0 K/uL (0.0-0.2)   01/10/19  04:25    


 


PT  21.6 Seconds (9.8-13.1)  H  01/11/19  05:00    


 


INR  1.9   01/11/19  05:00    


 


APTT  39.5 Seconds (25.6-37.1)  H  01/11/19  05:00    


 


Sodium  135 mmol/l (132-148)   01/11/19  05:00    


 


Potassium  4.3 MMOL/L (3.6-5.0)   01/11/19  05:00    


 


Chloride  106 mmol/L ()   01/11/19  05:00    


 


Carbon Dioxide  23 mmol/L (22-30)   01/11/19  05:00    


 


Anion Gap  10  (10-20)   01/11/19  05:00    


 


BUN  21 mg/dl (9-20)  H  01/11/19  05:00    


 


Creatinine  1.1 mg/dl (0.8-1.5)   01/11/19  05:00    


 


Est GFR ( Amer)  > 60   01/11/19  05:00    


 


Est GFR (Non-Af Amer)  > 60   01/11/19  05:00    


 


Random Glucose  151 mg/dL ()  H  01/11/19  05:00    


 


Calcium  9.6 mg/dL (8.4-10.2)   01/11/19  05:00    


 


Phosphorus  3.3 mg/dl (2.5-4.5)   01/10/19  04:25    


 


Magnesium  2.3 MG/DL (1.6-2.3)   01/10/19  04:25    


 


Total Bilirubin  1.4 mg/dl (0.2-1.3)  H  01/10/19  04:25    


 


AST  28 U/L (17-59)   01/10/19  04:25    


 


ALT  42 U/L (21-72)   01/10/19  04:25    


 


Alkaline Phosphatase  93 U/L ()   01/10/19  04:25    


 


Total Creatine Kinase  187 U/L ()  H  01/09/19  14:21    


 


Troponin I  0.0200 ng/mL (0.00-0.120)   01/09/19  14:21    


 


Total Protein  6.9 G/DL (6.3-8.2)   01/10/19  04:25    


 


Albumin  3.8 g/dL (3.5-5.0)   01/10/19  04:25    


 


Globulin  3.1 gm/dL (2.2-3.9)   01/10/19  04:25    


 


Albumin/Globulin Ratio  1.2  (1.0-2.1)   01/10/19  04:25    


 


Triglycerides  94 mg/DL (0-149)   01/09/19  16:30    


 


Cholesterol  169 mg/dL (0-199)   01/09/19  16:30    


 


LDL Cholesterol Direct  111 mg/dL (0-129)   01/09/19  16:30    


 


HDL Cholesterol  46 MG/DL (30-70)   01/09/19  16:30    


 


Prostate Specific Ag  4.25 ng/ML (0.00-4.0)  H  01/09/19  19:05    


 


Vitamin B12  364 pg/mL (239-931)   01/09/19  16:30    


 


25-OH Vitamin D Total  19.4 NG/ML (30.0-100.0)  L  01/09/19  16:49    


 


TSH 3rd Generation  0.81 mIU/ML (0.46-4.68)   01/09/19  14:21    


 


Urine Color  Yellow  (YELLOW)   01/09/19  17:19    


 


Urine Clarity  Slighty-cloudy  (Clear)   01/09/19  17:19    


 


Urine pH  5.0  (5.0-8.0)   01/09/19  17:19    


 


Ur Specific Gravity  1.023  (1.003-1.030)   01/09/19  17:19    


 


Urine Protein  30 mg/dL (NEGATIVE)   01/09/19  17:19    


 


Urine Glucose (UA)  Neg mg/dL (NEGATIVE)   01/09/19  17:19    


 


Urine Ketones  Trace mg/dL (NEGATIVE)   01/09/19  17:19    


 


Urine Blood  Moderate  (NEGATIVE)   01/09/19  17:19    


 


Urine Nitrate  Negative  (NEGATIVE)   01/09/19  17:19    


 


Urine Bilirubin  Negative  (NEGATIVE)   01/09/19  17:19    


 


Urine Urobilinogen  0.2-1.0 mg/dL (0.2-1.0)   01/09/19  17:19    


 


Ur Leukocyte Esterase  Neg Kwabena/uL (Negative)   01/09/19  17:19    


 


Urine RBC (Auto)  24 /hpf (0-3)  H  01/09/19  17:19    


 


Urine Microscopic WBC  3 /hpf (0-5)   01/09/19  17:19    


 


Hyaline Casts  6-10 /hpf (0-2)  H  01/09/19  17:19    


 


RPR  Nonreactive  (NONREACTIVE)   01/09/19  16:49    


 


HIV 1&2 Ag/Ab, 4th Gen  Nonreactive  (Nonreactive)   01/09/19  16:20    














Attending/Attestation





- Attestation


I have personally seen and examined this patient.: Yes


I have fully participated in the care of the patient.: Yes


I have reviewed all pertinent clinical information, including history, physical 

exam and plan: Yes


Notes (Text): 





01/11/19 11:23


Patient seen and examined with resident. Case discussed and agreed with 

assessment and plan. Patient discharged as per request from patient and daughter

 so he could be followed up in [place] with his daughter.